# Patient Record
Sex: FEMALE | Race: OTHER
[De-identification: names, ages, dates, MRNs, and addresses within clinical notes are randomized per-mention and may not be internally consistent; named-entity substitution may affect disease eponyms.]

---

## 2019-03-15 ENCOUNTER — HOSPITAL ENCOUNTER (INPATIENT)
Dept: HOSPITAL 72 - EMR | Age: 84
LOS: 7 days | DRG: 283 | End: 2019-03-22
Payer: MEDICARE

## 2019-03-15 VITALS — DIASTOLIC BLOOD PRESSURE: 100 MMHG | SYSTOLIC BLOOD PRESSURE: 180 MMHG

## 2019-03-15 VITALS — HEIGHT: 59 IN | WEIGHT: 131.13 LBS | BODY MASS INDEX: 26.44 KG/M2

## 2019-03-15 VITALS — SYSTOLIC BLOOD PRESSURE: 194 MMHG | DIASTOLIC BLOOD PRESSURE: 115 MMHG

## 2019-03-15 VITALS — DIASTOLIC BLOOD PRESSURE: 100 MMHG | SYSTOLIC BLOOD PRESSURE: 163 MMHG

## 2019-03-15 VITALS — DIASTOLIC BLOOD PRESSURE: 123 MMHG | SYSTOLIC BLOOD PRESSURE: 187 MMHG

## 2019-03-15 DIAGNOSIS — I21.4: Primary | ICD-10-CM

## 2019-03-15 DIAGNOSIS — R13.10: ICD-10-CM

## 2019-03-15 DIAGNOSIS — R00.0: ICD-10-CM

## 2019-03-15 DIAGNOSIS — E44.1: ICD-10-CM

## 2019-03-15 DIAGNOSIS — J98.01: ICD-10-CM

## 2019-03-15 DIAGNOSIS — D75.1: ICD-10-CM

## 2019-03-15 DIAGNOSIS — J44.9: ICD-10-CM

## 2019-03-15 DIAGNOSIS — T38.0X5A: ICD-10-CM

## 2019-03-15 DIAGNOSIS — F03.90: ICD-10-CM

## 2019-03-15 DIAGNOSIS — N17.9: ICD-10-CM

## 2019-03-15 DIAGNOSIS — J69.0: ICD-10-CM

## 2019-03-15 DIAGNOSIS — Z88.6: ICD-10-CM

## 2019-03-15 DIAGNOSIS — D72.829: ICD-10-CM

## 2019-03-15 DIAGNOSIS — I16.0: ICD-10-CM

## 2019-03-15 DIAGNOSIS — E87.0: ICD-10-CM

## 2019-03-15 DIAGNOSIS — Z51.5: ICD-10-CM

## 2019-03-15 DIAGNOSIS — Z66: ICD-10-CM

## 2019-03-15 DIAGNOSIS — F41.9: ICD-10-CM

## 2019-03-15 DIAGNOSIS — E87.8: ICD-10-CM

## 2019-03-15 DIAGNOSIS — J96.01: ICD-10-CM

## 2019-03-15 DIAGNOSIS — E86.0: ICD-10-CM

## 2019-03-15 DIAGNOSIS — E03.9: ICD-10-CM

## 2019-03-15 LAB
ADD MANUAL DIFF: NO
ALBUMIN SERPL-MCNC: 3.5 G/DL (ref 3.4–5)
ALBUMIN/GLOB SERPL: 0.9 {RATIO} (ref 1–2.7)
ALP SERPL-CCNC: 67 U/L (ref 46–116)
ALT SERPL-CCNC: 22 U/L (ref 12–78)
ANION GAP SERPL CALC-SCNC: 4 MMOL/L (ref 5–15)
APPEARANCE UR: CLEAR
APTT PPP: YELLOW S
AST SERPL-CCNC: 45 U/L (ref 15–37)
BASOPHILS NFR BLD AUTO: 0.3 % (ref 0–2)
BILIRUB SERPL-MCNC: 0.8 MG/DL (ref 0.2–1)
BUN SERPL-MCNC: 57 MG/DL (ref 7–18)
CALCIUM SERPL-MCNC: 9.9 MG/DL (ref 8.5–10.1)
CHLORIDE SERPL-SCNC: 113 MMOL/L (ref 98–107)
CK SERPL-CCNC: 162 U/L (ref 26–308)
CO2 SERPL-SCNC: 33 MMOL/L (ref 21–32)
CREAT SERPL-MCNC: 1.2 MG/DL (ref 0.55–1.3)
EOSINOPHIL NFR BLD AUTO: 0.1 % (ref 0–3)
ERYTHROCYTE [DISTWIDTH] IN BLOOD BY AUTOMATED COUNT: 13.4 % (ref 11.6–14.8)
GLOBULIN SER-MCNC: 3.9 G/DL
GLUCOSE UR STRIP-MCNC: NEGATIVE MG/DL
HCT VFR BLD CALC: 48.9 % (ref 37–47)
HGB BLD-MCNC: 15.7 G/DL (ref 12–16)
KETONES UR QL STRIP: (no result)
LEUKOCYTE ESTERASE UR QL STRIP: NEGATIVE
LYMPHOCYTES NFR BLD AUTO: 9.5 % (ref 20–45)
MCV RBC AUTO: 93 FL (ref 80–99)
MONOCYTES NFR BLD AUTO: 6.5 % (ref 1–10)
NEUTROPHILS NFR BLD AUTO: 83.6 % (ref 45–75)
NITRITE UR QL STRIP: NEGATIVE
PH UR STRIP: 5 [PH] (ref 4.5–8)
PLATELET # BLD: 362 K/UL (ref 150–450)
POTASSIUM SERPL-SCNC: 3.7 MMOL/L (ref 3.5–5.1)
PROT UR QL STRIP: (no result)
RBC # BLD AUTO: 5.23 M/UL (ref 4.2–5.4)
SODIUM SERPL-SCNC: 150 MMOL/L (ref 136–145)
SP GR UR STRIP: 1.02 (ref 1–1.03)
UROBILINOGEN UR-MCNC: NORMAL MG/DL (ref 0–1)
WBC # BLD AUTO: 10.9 K/UL (ref 4.8–10.8)

## 2019-03-15 PROCEDURE — 83735 ASSAY OF MAGNESIUM: CPT

## 2019-03-15 PROCEDURE — 83605 ASSAY OF LACTIC ACID: CPT

## 2019-03-15 PROCEDURE — 99285 EMERGENCY DEPT VISIT HI MDM: CPT

## 2019-03-15 PROCEDURE — 96374 THER/PROPH/DIAG INJ IV PUSH: CPT

## 2019-03-15 PROCEDURE — 82248 BILIRUBIN DIRECT: CPT

## 2019-03-15 PROCEDURE — 80048 BASIC METABOLIC PNL TOTAL CA: CPT

## 2019-03-15 PROCEDURE — 71045 X-RAY EXAM CHEST 1 VIEW: CPT

## 2019-03-15 PROCEDURE — 94640 AIRWAY INHALATION TREATMENT: CPT

## 2019-03-15 PROCEDURE — 82550 ASSAY OF CK (CPK): CPT

## 2019-03-15 PROCEDURE — 80053 COMPREHEN METABOLIC PANEL: CPT

## 2019-03-15 PROCEDURE — 82803 BLOOD GASES ANY COMBINATION: CPT

## 2019-03-15 PROCEDURE — 36415 COLL VENOUS BLD VENIPUNCTURE: CPT

## 2019-03-15 PROCEDURE — 84484 ASSAY OF TROPONIN QUANT: CPT

## 2019-03-15 PROCEDURE — 87040 BLOOD CULTURE FOR BACTERIA: CPT

## 2019-03-15 PROCEDURE — 85007 BL SMEAR W/DIFF WBC COUNT: CPT

## 2019-03-15 PROCEDURE — 93005 ELECTROCARDIOGRAM TRACING: CPT

## 2019-03-15 PROCEDURE — 36600 WITHDRAWAL OF ARTERIAL BLOOD: CPT

## 2019-03-15 PROCEDURE — 94664 DEMO&/EVAL PT USE INHALER: CPT

## 2019-03-15 PROCEDURE — 94760 N-INVAS EAR/PLS OXIMETRY 1: CPT

## 2019-03-15 PROCEDURE — 85025 COMPLETE CBC W/AUTO DIFF WBC: CPT

## 2019-03-15 PROCEDURE — 81003 URINALYSIS AUTO W/O SCOPE: CPT

## 2019-03-15 PROCEDURE — 96375 TX/PRO/DX INJ NEW DRUG ADDON: CPT

## 2019-03-15 RX ADMIN — APIXABAN SCH MG: 2.5 TABLET, FILM COATED ORAL at 21:13

## 2019-03-15 RX ADMIN — METHYLPREDNISOLONE SODIUM SUCCINATE SCH MG: 125 INJECTION, POWDER, FOR SOLUTION INTRAMUSCULAR; INTRAVENOUS at 21:17

## 2019-03-15 RX ADMIN — IPRATROPIUM BROMIDE AND ALBUTEROL SULFATE SCH ML: .5; 3 SOLUTION RESPIRATORY (INHALATION) at 19:00

## 2019-03-15 RX ADMIN — LORAZEPAM PRN MG: 2 INJECTION, SOLUTION INTRAMUSCULAR; INTRAVENOUS at 23:43

## 2019-03-15 RX ADMIN — NITROGLYCERIN SCH PATCH: 0.2 PATCH TRANSDERMAL at 21:12

## 2019-03-15 RX ADMIN — SODIUM CHLORIDE SCH MLS/HR: 0.9 INJECTION INTRAVENOUS at 18:41

## 2019-03-15 RX ADMIN — IPRATROPIUM BROMIDE AND ALBUTEROL SULFATE SCH ML: .5; 3 SOLUTION RESPIRATORY (INHALATION) at 23:53

## 2019-03-15 NOTE — DIAGNOSTIC IMAGING REPORT
Indication: Chest pain, history of COPD

 

Technique: One view of the chest

 

Comparison: none

 

Findings: There is mild generalized interstitial prominence and central bronchial

wall thickening. No focal airspace consolidation. No effusions. Heart size is normal.

The aorta is tortuous and calcified

 

Impression: Minimal interstitial prominence and central bronchial wall thickening,

suspect related to senescent and/or COPD changes. No definite acute process

## 2019-03-15 NOTE — NUR
NURSE NOTES:

Received report from LORY Smith. Patient in bed asleep showing no signs of acute distress. 
Respiration even and non labored on 2L. No SOB noted. HOB elevated, aspiration precaution 
observed. IV lines patent and intact. Bed in lowest position. Call light within reach. All 
needs attended and met. Will continue plan of care.

## 2019-03-15 NOTE — NUR
NURSE NOTES:

received pt awake alert, nonverbal, no distress. 96%o2 on 2lnc, sacral intact, hr 130 by 
palpation, bp 180/100, relayedto Dr Prince along with asking md for prn and admit orders. 
awaiting response

## 2019-03-15 NOTE — NUR
NURSE NOTES:

Dr Prince made aware of bp elevated after troponin , received order to give cardizem 30mg 
po xq1, ordered and given

## 2019-03-15 NOTE — NUR
ED Nurse Note:





Report given to Luis HENLEY

Updated daughter Shania KUNZ, 

ENdorsed to Luis that Hospice Nurse number is [phone]

Patient has no belongings

## 2019-03-15 NOTE — NUR
ED Nurse Note:





Brought in by ambulance from home c/o due to SOB. POA, daughter  called [phone] 
Patient is under hospiece care. Awake, but non verbal. 

Sacral / heel skin is intact

## 2019-03-15 NOTE — HISTORY AND PHYSICAL REPORT
DATE OF ADMISSION:  03/15/2019

PULMONARY/HISTORY AND PHYSICAL



REASON FOR ADMISSION:  Shortness of breath.



HISTORY:  This is an 84-year-old female, presented by paramedics with

significant respiratory distress.  The patient is apparently on hospice

care.  The patient is also on antipsychotics.  The patient's family wanted

to revoke hospice.  The daughter still confirms Do Not Resuscitate status.

The patient care discussed and reviewed.  The patient is unable to give

much in the way of history.  Findings discussed with the nursing staff.

The patient with multitude of medical problems 



PAST MEDICAL HISTORY:  Notable for COPD.  Other medical problems

constipation, possible depression, urinary incontinence, hypothyroidism,

possible coronary artery disease, dementia with psychosis, chronic pain,

and possible thrombotic disorders.



MEDICATIONS:  Reviewed.



ALLERGIES:  Reviewed.



SOCIAL HISTORY:  The patient is a Do Not Resuscitate.  She was previously

on hospice.  Disabled.



REVIEW OF SYSTEMS:  Unobtainable.



PHYSICAL EXAMINATION:

GENERAL:  A well-developed, chronically ill female.

VITAL SIGNS:  However, vital signs, heart rate varying from 119 to 130,

blood pressure 180/100, temperature is 98.5, and respiratory rate is 20.

HEENT:  Overall fairly negative.

NECK:  Otherwise supple.

LUNGS:  Moderate breath sounds.  Scattered rhonchi and wheezes.

CARDIAC:  Tachycardic without murmurs or rubs.

ABDOMEN:  Soft and nontender.

EXTREMITIES:  No cyanosis.  No clear clubbing.

NEUROLOGIC:  Confused, on oxygen, nonverbal currently.



LABORATORY DATA:  Reviewed.  White count is 10.9, hematocrit 48, and

platelets of 362,000.  Chemistries noted.  Sodium 150, BUN 57, and

creatinine 1.2.  Lactic acid 1.4.  Troponin 0.47.  The x-rays were

reviewed with questionable interstitial changes with evidence of COPD.



IMPRESSION:

1. Significant sinus tachycardia.

2. Elevated troponin.

3. Possible demand ischemia.

4. Possible acute myocardial infarction.

5. Evidence of acute renal failure.

6. Hypernatremia.

7. Chronic obstructive pulmonary disease.

8. Shortness of breath.



RECOMMENDATIONS:

1. Supportive care.

2. Resume home medication.

3. IV hydration.

4. Renal evaluation.

5. Intravenous steroids.

6. Cardiology evaluation.

7. Monitor clinically for changes.

8. Do not resuscitate to be confirmed.

9. Prognosis is overall poor.

10. We will follow clinically for further changes and recommendations.









  ______________________________________________

  Audie Prince M.D.





DR:  BRYON

D:  03/15/2019 17:06

T:  03/15/2019 20:36

JOB#:  [id number]

CC:



ROSHNI

## 2019-03-15 NOTE — NUR
NURSE NOTES:

paged Dr Prince 2nd attempt to get admit orders

also awaiting med info from [facility]

## 2019-03-15 NOTE — EMERGENCY ROOM REPORT
History of Present Illness


General


Chief Complaint:  Dyspnea/Respdistress


Source:  Patient, Family Member, EMS





Present Illness


HPI


Patient presents by paramedics with reports of respiratory distress


Sensation of doom


Daughter is here who reports the patient is in hospice care they have been 

attempting IV hydration


Also antipsychotic medication however the patient appeared extremely agitated 

she did not feel comfortable with the patient at home and call paramedics


Patient herself is nonverbal


Has a gaze to the right side





Upon arrival of the daughter she reports that the patient looks significantly 

better than previous


She is still requesting no intubation and no CPR


Allergies:  


Coded Allergies:  


     ASPIRIN (Verified  Allergy, Unknown, 3/15/19)





Patient History


Past Medical History:  see triage record


Pertinent Family History:  none


Reviewed Nursing Documentation:  PMH: Agreed; PSxH: Agreed





Nursing Documentation-PMH


Past Medical History:  No History, Except For


Hx Cardiac Problems:  Yes


Hx Hypertension:  Yes


Hx COPD:  Yes - emphysema


Hx Diabetes:  Yes





Review of Systems


All Other Systems:  limited - Other than the ones mentioned in the history of 

present illness all others are reviewed however they do stay limited due to the 

patient's mental status





Physical Exam





Vital Signs








  Date Time  Temp Pulse Resp B/P (MAP) Pulse Ox O2 Delivery O2 Flow Rate FiO2


 


3/15/19 12:53 95.2 120 4 141/90 97 Room Air  


 


3/15/19 14:21       4.0 36








Sp02 EP Interpretation:  reviewed, normal


General Appearance:  moderate distress - Tachypneic


Head:  normocephalic, atraumatic


Eyes:  bilateral eye PERRL, bilateral eye EOMI


ENT:  dry mucus membranes


Neck:  supple, no meningismus


Respiratory:  crackles, wheezing - Bilaterally mildly tachypneic


Cardiovascular #1:  tachycardia


Gastrointestinal:  non tender, soft


Musculoskeletal:  normal inspection


Neurologic:  other - Responsive to physical stimuli otherwise decreased GCS 

nonverbal, daughter reports is normal for the patient


Skin:  normal color, no rash


Lymphatic:  no adenopathy





Procedures


Critical Care Time


Critical Care Time


50 minutes for multiple re-evaluations, critical presentation concerning for 

specific failure not including any procedural time





Medical Decision Making


Diagnostic Impression:  


 Primary Impression:  


 Respiratory distress


ER Course


Patient is a fairly complex patient with multiple differential to consideration 

including but not limited to cardiac cardiopulmonary and vascular emergencies





Patient provided with further hydration breathing treatments oxygenation 

daughter reports the patient looks significantly improved





She would like to have the patient remain DO NOT RESUSCITATE, and no CPR 

however would like some intervention regarding IV hydration and comfort care





X-ray does not show any acute pathology patient admitted for further inpatient 

care





Labs








Test


  3/15/19


13:15


 


White Blood Count


  10.9 K/UL


(4.8-10.8)


 


Red Blood Count


  5.23 M/UL


(4.20-5.40)


 


Hemoglobin


  15.7 G/DL


(12.0-16.0)


 


Hematocrit


  48.9 %


(37.0-47.0)


 


Mean Corpuscular Volume 93 FL (80-99) 


 


Mean Corpuscular Hemoglobin


  30.1 PG


(27.0-31.0)


 


Mean Corpuscular Hemoglobin


Concent 32.1 G/DL


(32.0-36.0)


 


Red Cell Distribution Width


  13.4 %


(11.6-14.8)


 


Platelet Count


  362 K/UL


(150-450)


 


Mean Platelet Volume


  5.4 FL


(6.5-10.1)


 


Neutrophils (%) (Auto)


  83.6 %


(45.0-75.0)


 


Lymphocytes (%) (Auto)


  9.5 %


(20.0-45.0)


 


Monocytes (%) (Auto)


  6.5 %


(1.0-10.0)


 


Eosinophils (%) (Auto)


  0.1 %


(0.0-3.0)


 


Basophils (%) (Auto)


  0.3 %


(0.0-2.0)


 


Sodium Level


  150 MMOL/L


(136-145)


 


Potassium Level


  3.7 MMOL/L


(3.5-5.1)


 


Chloride Level


  113 MMOL/L


()


 


Carbon Dioxide Level


  33 MMOL/L


(21-32)


 


Anion Gap


  4 mmol/L


(5-15)


 


Blood Urea Nitrogen


  57 mg/dL


(7-18)


 


Creatinine


  1.2 MG/DL


(0.55-1.30)


 


Estimat Glomerular Filtration


Rate  mL/min (>60) 


 


 


Glucose Level


  130 MG/DL


()


 


Lactic Acid Level


  1.40 mmol/L


(0.4-2.0)


 


Calcium Level


  9.9 MG/DL


(8.5-10.1)


 


Total Bilirubin


  0.8 MG/DL


(0.2-1.0)


 


Aspartate Amino Transf


(AST/SGOT) 45 U/L (15-37) 


 


 


Alanine Aminotransferase


(ALT/SGPT) 22 U/L (12-78) 


 


 


Alkaline Phosphatase


  67 U/L


()


 


Total Creatine Kinase


  162 U/L


()


 


Troponin I


  0.470 ng/mL


(0.000-0.056)


 


Total Protein


  7.4 G/DL


(6.4-8.2)


 


Albumin


  3.5 G/DL


(3.4-5.0)


 


Globulin 3.9 g/dL 


 


Albumin/Globulin Ratio 0.9 (1.0-2.7) 








Rhythm Strip Diag. Results


EP Interpretation:  yes


Rate:  125


Rhythm:  no PVC's, no ectopy, other - Sinus tach





Chest X-Ray Diagnostic Results


Chest X-Ray Diagnostic Results :  


   Chest X-Ray Ordered:  Yes


   # of Views/Limited/Complete:  1 View


   Indication:  Chest Pain


   EP Interpretation:  Yes


   Interpretation:  no consolidation, no effusion, no pneumothorax


   Impression:  No acute disease - Some pulmonary congestion


   Electronically Signed by:  Gallo Marshall DO





Last Vital Signs








  Date Time  Temp Pulse Resp B/P (MAP) Pulse Ox O2 Delivery O2 Flow Rate FiO2


 


3/15/19 14:34  119 19  98 Nasal Cannula 4.0 36


 


3/15/19 12:53 95.2   141/90    








Status:  improved


Disposition:  ADMITTED AS INPATIENT


Condition:  Critical


Referrals:  


NON PHYSICIAN (PCP)











Gallo Marshall DO Mar 15, 2019 14:43

## 2019-03-16 VITALS — SYSTOLIC BLOOD PRESSURE: 179 MMHG | DIASTOLIC BLOOD PRESSURE: 118 MMHG

## 2019-03-16 VITALS — SYSTOLIC BLOOD PRESSURE: 150 MMHG | DIASTOLIC BLOOD PRESSURE: 90 MMHG

## 2019-03-16 VITALS — DIASTOLIC BLOOD PRESSURE: 123 MMHG | SYSTOLIC BLOOD PRESSURE: 186 MMHG

## 2019-03-16 VITALS — SYSTOLIC BLOOD PRESSURE: 154 MMHG | DIASTOLIC BLOOD PRESSURE: 87 MMHG

## 2019-03-16 VITALS — SYSTOLIC BLOOD PRESSURE: 170 MMHG | DIASTOLIC BLOOD PRESSURE: 96 MMHG

## 2019-03-16 VITALS — SYSTOLIC BLOOD PRESSURE: 155 MMHG | DIASTOLIC BLOOD PRESSURE: 89 MMHG

## 2019-03-16 LAB
ADD MANUAL DIFF: YES
ANION GAP SERPL CALC-SCNC: 7 MMOL/L (ref 5–15)
BUN SERPL-MCNC: 69 MG/DL (ref 7–18)
CALCIUM SERPL-MCNC: 10 MG/DL (ref 8.5–10.1)
CHLORIDE SERPL-SCNC: 113 MMOL/L (ref 98–107)
CO2 SERPL-SCNC: 32 MMOL/L (ref 21–32)
CREAT SERPL-MCNC: 1.3 MG/DL (ref 0.55–1.3)
ERYTHROCYTE [DISTWIDTH] IN BLOOD BY AUTOMATED COUNT: 13.6 % (ref 11.6–14.8)
HCT VFR BLD CALC: 47.8 % (ref 37–47)
HGB BLD-MCNC: 15.5 G/DL (ref 12–16)
MCV RBC AUTO: 93 FL (ref 80–99)
PLATELET # BLD: 386 K/UL (ref 150–450)
POTASSIUM SERPL-SCNC: 3.6 MMOL/L (ref 3.5–5.1)
RBC # BLD AUTO: 5.13 M/UL (ref 4.2–5.4)
SODIUM SERPL-SCNC: 152 MMOL/L (ref 136–145)
WBC # BLD AUTO: 7 K/UL (ref 4.8–10.8)

## 2019-03-16 RX ADMIN — CITALOPRAM HYDROBROMIDE SCH MG: 20 TABLET, FILM COATED ORAL at 08:29

## 2019-03-16 RX ADMIN — DILTIAZEM HYDROCHLORIDE SCH MG: 60 CAPSULE, EXTENDED RELEASE ORAL at 12:32

## 2019-03-16 RX ADMIN — METHYLPREDNISOLONE SODIUM SUCCINATE SCH MG: 125 INJECTION, POWDER, FOR SOLUTION INTRAMUSCULAR; INTRAVENOUS at 08:28

## 2019-03-16 RX ADMIN — IPRATROPIUM BROMIDE AND ALBUTEROL SULFATE SCH ML: .5; 3 SOLUTION RESPIRATORY (INHALATION) at 03:12

## 2019-03-16 RX ADMIN — SODIUM CHLORIDE SCH MLS/HR: 0.9 INJECTION INTRAVENOUS at 05:46

## 2019-03-16 RX ADMIN — TOLTERODINE TARTRATE SCH MG: 2 TABLET, FILM COATED ORAL at 08:29

## 2019-03-16 RX ADMIN — SODIUM CHLORIDE SCH MLS/HR: 0.9 INJECTION INTRAVENOUS at 01:37

## 2019-03-16 RX ADMIN — APIXABAN SCH MG: 2.5 TABLET, FILM COATED ORAL at 08:29

## 2019-03-16 RX ADMIN — SODIUM CHLORIDE SCH MLS/HR: 0.9 INJECTION INTRAVENOUS at 18:47

## 2019-03-16 RX ADMIN — APIXABAN SCH MG: 2.5 TABLET, FILM COATED ORAL at 21:00

## 2019-03-16 RX ADMIN — IPRATROPIUM BROMIDE AND ALBUTEROL SULFATE SCH ML: .5; 3 SOLUTION RESPIRATORY (INHALATION) at 23:22

## 2019-03-16 RX ADMIN — PANTOPRAZOLE SODIUM SCH MG: 40 INJECTION, POWDER, FOR SOLUTION INTRAVENOUS at 08:42

## 2019-03-16 RX ADMIN — SODIUM CHLORIDE SCH MLS/HR: 0.9 INJECTION INTRAVENOUS at 18:48

## 2019-03-16 RX ADMIN — IPRATROPIUM BROMIDE AND ALBUTEROL SULFATE SCH ML: .5; 3 SOLUTION RESPIRATORY (INHALATION) at 15:00

## 2019-03-16 RX ADMIN — DILTIAZEM HYDROCHLORIDE SCH MG: 60 CAPSULE, EXTENDED RELEASE ORAL at 00:00

## 2019-03-16 RX ADMIN — PANTOPRAZOLE SODIUM SCH MG: 40 INJECTION, POWDER, FOR SOLUTION INTRAVENOUS at 21:33

## 2019-03-16 RX ADMIN — DILTIAZEM HYDROCHLORIDE SCH MG: 60 CAPSULE, EXTENDED RELEASE ORAL at 06:00

## 2019-03-16 RX ADMIN — IPRATROPIUM BROMIDE AND ALBUTEROL SULFATE SCH ML: .5; 3 SOLUTION RESPIRATORY (INHALATION) at 20:02

## 2019-03-16 RX ADMIN — NITROGLYCERIN SCH PATCH: 0.2 PATCH TRANSDERMAL at 21:33

## 2019-03-16 RX ADMIN — IPRATROPIUM BROMIDE AND ALBUTEROL SULFATE SCH ML: .5; 3 SOLUTION RESPIRATORY (INHALATION) at 11:00

## 2019-03-16 RX ADMIN — DILTIAZEM HYDROCHLORIDE SCH MG: 60 CAPSULE, EXTENDED RELEASE ORAL at 18:00

## 2019-03-16 RX ADMIN — SODIUM CHLORIDE SCH MLS/HR: 0.9 INJECTION INTRAVENOUS at 12:32

## 2019-03-16 RX ADMIN — IPRATROPIUM BROMIDE AND ALBUTEROL SULFATE SCH ML: .5; 3 SOLUTION RESPIRATORY (INHALATION) at 07:00

## 2019-03-16 NOTE — NUR
NURSE NOTES:

Received report from LORY Hernández. Patient in bed asleep showing no signs of acute distress. 
Respiration even and non labored on 9L O2 Venturi Mask. No SOB noted. HOB elevated, 
aspiration precaution observed. IV lines patent and intact. Bed in lowest position. Call 
light within reach. All needs attended and met. Will continue plan of care.

## 2019-03-16 NOTE — NUR
HAND-OFF: 

Report given to LORY Haque.  Patient sitting in semi-Dobson's position, sleeping, 
non-arousable to voice, on venturi mask, daughter at bedside, bed in lowest position, call 
light within reach, in no apparent distress.

## 2019-03-16 NOTE — PULMONOLOGY PROGRESS NOTE
Assessment/Plan


Assessment/Plan





IMPRESSION:


1. Significant sinus tachycardia.


2. Elevated troponin.


3. Possible demand ischemia.


4. Possible acute myocardial infarction.


5. Evidence of acute renal failure.


6. Hypernatremia.


7. Chronic obstructive pulmonary disease.


8. Shortness of breath.


9. hypertension


10. anxiety 





PLAN


IV hydration


bp rx


iv solumedrol


iv antibiotics


ativan


DNR confirmed


prognosis poor


impression, plan, and exam edited and reviewed in detail


care discussed with RN





Subjective


ROS Limited/Unobtainable:  Yes


Allergies:  


Coded Allergies:  


     ASPIRIN (Verified  Allergy, Unknown, 3/15/19)


Subjective


withdrawn





Objective





Last 24 Hour Vital Signs








  Date Time  Temp Pulse Resp B/P (MAP) Pulse Ox O2 Delivery O2 Flow Rate FiO2


 


3/16/19 08:27    186/123    


 


3/16/19 08:01     94 Nasal Cannula 4.0 36


 


3/16/19 08:01      Nasal Cannula 4.0 36


 


3/16/19 08:00 98.3 127 21 186/123 (144) 93   


 


3/16/19 07:59  124 19  94 Nasal Cannula 3.0 32


 


3/16/19 07:57  124 20  94 Nasal Cannula 3.0 32


 


3/16/19 05:46  128  174/111    


 


3/16/19 04:00 99.0 121 20 154/87 (109) 92   


 


3/16/19 04:00  120      


 


3/16/19 03:23  117 19  96 Nasal Cannula 3.0 32


 


3/16/19 03:12  120 20  94 Nasal Cannula 3.0 32


 


3/16/19 01:37  153  156/92    


 


3/16/19 00:00 98.8 117 22 150/90 (110) 90   


 


3/16/19 00:00  117  150/90    


 


3/16/19 00:00  159      


 


3/15/19 23:53      Nasal Cannula 4.0 36


 


3/15/19 23:53      Nasal Cannula 4.0 36


 


3/15/19 23:11    173/113    


 


3/15/19 21:45 98.6       


 


3/15/19 21:16    194/115    


 


3/15/19 21:12    194/115    


 


3/15/19 21:00      Nasal Cannula 4.0 36


 


3/15/19 21:00      Nasal Cannula 2.0 


 


3/15/19 21:00     95 Nasal Cannula 4.0 36


 


3/15/19 20:51  140 22   Nasal Cannula 4.0 36


 


3/15/19 20:48  140 20  96 Nasal Cannula 4.0 36


 


3/15/19 20:48      Nasal Cannula  


 


3/15/19 20:00 101.1 148 20 194/115 (141) 93   


 


3/15/19 20:00  140      


 


3/15/19 19:24  140  187/123    


 


3/15/19 19:04 98.5 140 20 187/123 (144) 95   


 


3/15/19 17:16    180/100    


 


3/15/19 16:15  123      


 


3/15/19 16:08      Nasal Cannula 2.0 


 


3/15/19 15:32 98.5 130 20 180/100 (126) 95   


 


3/15/19 15:00 96.8 118 23 163/100 97 Nasal Cannula 4.0 36


 


3/15/19 14:59  119 23   Nasal Cannula 4.0 36


 


3/15/19 14:59 96.8 118 23 163/100 97 Nasal Cannula 4.0 36


 


3/15/19 14:34  119 19  98 Nasal Cannula 4.0 36


 


3/15/19 14:21  119 25  98 Nasal Cannula 4.0 36


 


3/15/19 14:21  119 34   Nasal Cannula 4.0 36


 


3/15/19 12:53 95.2 120 4 141/90 97 Room Air  








Objective


GENERAL:  A well-developed, chronically ill female.


HEENT:  Overall fairly negative.


NECK:  Otherwise supple.


LUNGS:  Moderate breath sounds.  noted rhonchi and wheezes.


CARDIAC:  Tachycardic without murmurs or rubs.


ABDOMEN:  Soft and nontender. no distentions


EXTREMITIES:  No cyanosis.  No clear clubbing.


NEUROLOGIC:  Confused, on oxygen, nonverbal currently.


Laboratory Tests


3/15/19 13:15: 


White Blood Count 10.9H, Red Blood Count 5.23, Hemoglobin 15.7, Hematocrit 48.9H

, Mean Corpuscular Volume 93, Mean Corpuscular Hemoglobin 30.1, Mean 

Corpuscular Hemoglobin Concent 32.1, Red Cell Distribution Width 13.4, Platelet 

Count 362, Mean Platelet Volume 5.4L, Neutrophils (%) (Auto) 83.6H, Lymphocytes 

(%) (Auto) 9.5L, Monocytes (%) (Auto) 6.5, Eosinophils (%) (Auto) 0.1, 

Basophils (%) (Auto) 0.3, Sodium Level 150H, Potassium Level 3.7, Chloride 

Level 113H, Carbon Dioxide Level 33H, Anion Gap 4L, Blood Urea Nitrogen 57H, 

Creatinine 1.2, Estimat Glomerular Filtration Rate , Glucose Level 130H, Lactic 

Acid Level 1.40, Calcium Level 9.9, Total Bilirubin 0.8, Aspartate Amino Transf 

(AST/SGOT) 45H, Alanine Aminotransferase (ALT/SGPT) 22, Alkaline Phosphatase 67

, Total Creatine Kinase 162, Troponin I 0.470H, Total Protein 7.4, Albumin 3.5, 

Globulin 3.9, Albumin/Globulin Ratio 0.9L


3/15/19 14:32: 


Urine Color Yellow, Urine Appearance Clear, Urine pH 5, Urine Specific Gravity 

1.025, Urine Protein 3+H, Urine Glucose (UA) Negative, Urine Ketones 2+H, Urine 

Blood 2+H, Urine Nitrite Negative, Urine Bilirubin Negative, Urine Urobilinogen 

Normal, Urine Leukocyte Esterase Negative, Urine RBC 2-4H, Urine WBC 0, Urine 

Squamous Epithelial Cells Occasional, Urine Bacteria None


3/16/19 04:00: 


Arterial Blood pH 7.450, Arterial Blood Partial Pressure CO2 41.9, Arterial 

Blood Partial Pressure O2 70.6L, Arterial Blood HCO3 28.7H, Arterial Blood 

Oxygen Saturation 94.0L, Arterial Blood Base Excess 4.2H, Meliton Test Positive


3/16/19 05:55: 


White Blood Count 7.0, Red Blood Count 5.13, Hemoglobin 15.5, Hematocrit 47.8H, 

Mean Corpuscular Volume 93, Mean Corpuscular Hemoglobin 30.2, Mean Corpuscular 

Hemoglobin Concent 32.4, Red Cell Distribution Width 13.6, Platelet Count 386, 

Mean Platelet Volume 5.2L, Neutrophils (%) (Auto) , Lymphocytes (%) (Auto) , 

Monocytes (%) (Auto) , Eosinophils (%) (Auto) , Basophils (%) (Auto) , Sodium 

Level 152H, Potassium Level 3.6, Chloride Level 113H, Carbon Dioxide Level 32, 

Anion Gap 7, Blood Urea Nitrogen 69H, Creatinine 1.3, Estimat Glomerular 

Filtration Rate , Glucose Level 217H, Calcium Level 10.0, Neutrophils % (Manual

) [Pending], Lymphocytes % (Manual) [Pending], Platelet Estimate [Pending], 

Platelet Morphology [Pending]





Current Medications








 Medications


  (Trade)  Dose


 Ordered  Sig/Alisha


 Route


 PRN Reason  Start Time


 Stop Time Status Last Admin


Dose Admin


 


 Acetaminophen


  (Tylenol)  650 mg  Q4H  PRN


 RECTAL


 Mild Pain (Pain Scale 1-3)  3/15/19 18:30


 4/14/19 18:29  3/15/19 21:15


 


 


 Albuterol/


 Ipratropium


  (Albuterol/


 Ipratropium)  3 ml  Q4HRT


 HHN


   3/15/19 19:00


 3/20/19 18:59  3/16/19 03:12


 


 


 Apixaban


  (Eliquis)  2.5 mg  Q12HR


 ORAL


   3/15/19 21:00


 4/14/19 20:59  3/16/19 08:29


 


 


 Atropine Sulfate


  (Atropine Opth


 Sol)  2 drop  Q2H  PRN


 SL


 oral secretions  3/15/19 19:00


 4/14/19 18:59   


 


 


 Bisacodyl


  (Dulcolax)  5 mg  DAILYPRN  PRN


 ORAL


 Constipation  3/15/19 18:30


 4/14/19 18:29   


 


 


 Bisacodyl


  (Dulcolax)  10 mg  DAILYPRN  PRN


 RECTAL


 Constipation  3/15/19 18:30


 4/14/19 18:29   


 


 


 Ceftriaxone


 Sodium 1 gm/


 Dextrose  55 ml @ 


 110 mls/hr  Q24H


 IVPB


   3/15/19 18:00


 3/22/19 17:59  3/15/19 18:41


 


 


 Citalopram


 Hydrobromide


  (celeXA)  20 mg  DAILY


 ORAL


   3/16/19 09:00


 4/15/19 08:59   


 


 


 Clonidine HCl


  (Catapres Tab)  0.1 mg  Q4H  PRN


 ORAL


 sbp>150  3/15/19 16:30


 4/14/19 16:29  3/16/19 08:27


 


 


 Diltiazem HCl


  (Cardizem)  30 mg  Q6HR


 ORAL


   3/16/19 00:00


 4/15/19 00:00   


 


 


 Gabapentin


  (Neurontin)  100 mg  QHS


 ORAL


   3/15/19 21:00


 4/14/19 20:59  3/15/19 21:16


 


 


 Guaifenesin


  (Robitussin)  400 mg  Q6H  PRN


 PO


 For Cough  3/16/19 06:30


 4/15/19 06:29   


 


 


 Hydralazine HCl


  (Apresoline)  10 mg  Q4H  PRN


 IV


 SBP above 160  3/15/19 23:00


 4/14/19 22:59  3/15/19 23:11


 


 


 Ipratropium


 Bromide


  (Atrovent)  500 mcg  Q4H  PRN


 HHN


 Shortness of Breath  3/15/19 18:30


 3/20/19 18:29   


 


 


 Levothyroxine


 Sodium


  (Synthroid)  75 mcg  DAILY@0630


 ORAL


   3/16/19 06:30


 4/15/19 06:29   


 


 


 Lorazepam


  (Ativan 2mg/ml


 1ml)  1 mg  Q4H  PRN


 IV


 For Anxiety  3/15/19 21:00


 3/22/19 20:59  3/15/19 23:43


 


 


 Methylprednisolone


 Sodium Succinate


  (Solu-MEDROL)  60 mg  EVERY 12  HOURS


 IVP


   3/15/19 21:00


 4/14/19 20:59  3/16/19 08:28


 


 


 Metoprolol


 Tartrate 10 mg/


 Dextrose  65 ml @ 


 130 mls/hr  Q6HR


 IVPB


   3/16/19 00:00


 4/15/19 00:00  3/16/19 05:46


 


 


 Nitroglycerin


  (Ntg)  1 patch  Q24H


 TDERMAL


   3/15/19 20:00


 4/14/19 19:59  3/15/19 21:12


 


 


 Olanzapine


  (ZyPREXA)  5 mg  QPM


 ORAL


   3/16/19 16:30


 4/15/19 16:29   


 


 


 Ondansetron HCl


  (Zofran)  4 mg  Q6H  PRN


 IVP


 Nausea & Vomiting  3/15/19 22:45


 4/14/19 22:44   


 


 


 Pantoprazole


  (Protonix)  40 mg  Q12HR


 IVP


   3/16/19 09:00


 4/15/19 08:59  3/16/19 08:42


 


 


 Polyethylene


 Glycol


  (Miralax)  17 gm  DAILY  PRN


 ORAL


 Constipation  3/15/19 18:30


 4/14/19 18:29   


 


 


 Sodium Chloride  1,000 ml @ 


 100 mls/hr  Q10H


 IV


   3/15/19 17:00


 4/14/19 16:59  3/15/19 17:00


 


 


 Sodium Phosphate


  (Fleet's Sodium


 Phosl Enema)  133 ml  DAILYPRN  PRN


 RECTAL


 constipation  3/15/19 18:30


 4/14/19 18:29   


 


 


 Tolterodine


 Tartrate


  (Detrol)  2 mg  DAILY


 ORAL


   3/16/19 09:00


 4/15/19 08:59   


 

















Audie Prince MD Mar 16, 2019 09:48

## 2019-03-16 NOTE — CONSULTATION
DATE OF CONSULTATION:  03/16/2019

CONSULTING PHYSICIAN:  Calvin Whitfield M.D.



REFERRING PHYSICIAN:  Audie Prince M.D.



REASON FOR CONSULTATION:  Elevated troponin level with tachycardia.



HISTORY OF PRESENT ILLNESS:  This is an 84-year-old female.  She has been

home on hospice care.  She has advanced dementia.  She apparently has not

been able to take oral medications including her anxiolytics and

analgesics and has become increasingly uncomfortable according to her

daughter.  Hospice care could assist and she was brought to the

hospital.



The patient is unable to give any historical data.  Abnormal

cardiovascular studies have prompted this consultation.



PAST MEDICAL HISTORY:  COPD, dementia with psychosis, neuropathy with

chronic pain, hypothyroidism, history of type 2 diabetes mellitus, 
hypercoagulable state.



ALLERGIES:  Aspirin.



MEDICATIONS:  Reviewed and reconciled.



FAMILY HISTORY:  Noncontributory.



SOCIAL HISTORY:  Prior smoking history.  No history of alcohol or substance

abuse. Advance directives, DNR and more recently on hospice care.



REVIEW OF SYSTEMS:  Otherwise not obtainable.  Pertinent data from family

members as outlined above.



PHYSICAL EXAMINATION:

VITAL SIGNS:  Temperature 95.2, blood pressure 141/90 in the emergency room

with heart rate 120 and respiratory rate 24 and presently 118/100 with

heart rate 130.

HEENT:  Temporal rate wasting, right-sided facial gaze.

HEART:  Regular rhythm rapid rate.  Normal S1, S2.

ABDOMEN:  Soft.

EXTREMITIES:  With trace dependent edema.



LABORATORY AND DIAGNOSTIC DATA:  White count 10.9.  Hematocrit 48, sodium

150, BUN 57, and creatinine 1.2.  Troponin 0.47.



IMPRESSION:

1. Hypertensive urgency.

2. Acute myocardial ischemia and possible non-ST elevation infarction.

3. Sinus tachycardia, possible withdrawal from benzodiazepine.

4. Acute renal failure.

5. Dehydration.

6. Hypernatremia.

7. COPD.

8. Paroxysmal bronchospasm.

9. Advanced dementia.



PLAN:

1. Intravenous steroids.

2. Hypotonic IV fluids.

3. Cautious use of beta-blocker.

4. IV benzodiazepine.

5. Pain control.

6. DVT prophylaxis.

7. Clarify comfort care.









  ______________________________________________

  Calvin Whitfield M.D.





DR:  Vinod

D:  03/15/2019 22:40

T:  03/15/2019 23:18

JOB#:  [id number]

CC:



ROSHNI

## 2019-03-17 VITALS — SYSTOLIC BLOOD PRESSURE: 140 MMHG | DIASTOLIC BLOOD PRESSURE: 82 MMHG

## 2019-03-17 VITALS — SYSTOLIC BLOOD PRESSURE: 159 MMHG | DIASTOLIC BLOOD PRESSURE: 95 MMHG

## 2019-03-17 VITALS — DIASTOLIC BLOOD PRESSURE: 82 MMHG | SYSTOLIC BLOOD PRESSURE: 140 MMHG

## 2019-03-17 VITALS — DIASTOLIC BLOOD PRESSURE: 77 MMHG | SYSTOLIC BLOOD PRESSURE: 132 MMHG

## 2019-03-17 VITALS — DIASTOLIC BLOOD PRESSURE: 80 MMHG | SYSTOLIC BLOOD PRESSURE: 134 MMHG

## 2019-03-17 VITALS — DIASTOLIC BLOOD PRESSURE: 78 MMHG | SYSTOLIC BLOOD PRESSURE: 130 MMHG

## 2019-03-17 RX ADMIN — DILTIAZEM HYDROCHLORIDE SCH MG: 60 CAPSULE, EXTENDED RELEASE ORAL at 00:00

## 2019-03-17 RX ADMIN — DILTIAZEM HYDROCHLORIDE SCH MG: 60 CAPSULE, EXTENDED RELEASE ORAL at 11:46

## 2019-03-17 RX ADMIN — DILTIAZEM HYDROCHLORIDE SCH MG: 60 CAPSULE, EXTENDED RELEASE ORAL at 23:52

## 2019-03-17 RX ADMIN — IPRATROPIUM BROMIDE AND ALBUTEROL SULFATE SCH ML: .5; 3 SOLUTION RESPIRATORY (INHALATION) at 07:33

## 2019-03-17 RX ADMIN — APIXABAN SCH MG: 2.5 TABLET, FILM COATED ORAL at 08:46

## 2019-03-17 RX ADMIN — SODIUM CHLORIDE SCH MLS/HR: 0.9 INJECTION INTRAVENOUS at 17:32

## 2019-03-17 RX ADMIN — SODIUM CHLORIDE SCH MLS/HR: 0.9 INJECTION INTRAVENOUS at 13:49

## 2019-03-17 RX ADMIN — CITALOPRAM HYDROBROMIDE SCH MG: 20 TABLET, FILM COATED ORAL at 08:45

## 2019-03-17 RX ADMIN — LORAZEPAM PRN MG: 2 INJECTION, SOLUTION INTRAMUSCULAR; INTRAVENOUS at 21:24

## 2019-03-17 RX ADMIN — SODIUM CHLORIDE SCH MLS/HR: 0.9 INJECTION INTRAVENOUS at 05:56

## 2019-03-17 RX ADMIN — DILTIAZEM HYDROCHLORIDE SCH MG: 60 CAPSULE, EXTENDED RELEASE ORAL at 06:00

## 2019-03-17 RX ADMIN — DILTIAZEM HYDROCHLORIDE SCH MG: 60 CAPSULE, EXTENDED RELEASE ORAL at 17:26

## 2019-03-17 RX ADMIN — SODIUM CHLORIDE SCH MLS/HR: 0.9 INJECTION INTRAVENOUS at 17:29

## 2019-03-17 RX ADMIN — IPRATROPIUM BROMIDE AND ALBUTEROL SULFATE SCH ML: .5; 3 SOLUTION RESPIRATORY (INHALATION) at 11:15

## 2019-03-17 RX ADMIN — IPRATROPIUM BROMIDE AND ALBUTEROL SULFATE SCH ML: .5; 3 SOLUTION RESPIRATORY (INHALATION) at 15:22

## 2019-03-17 RX ADMIN — IPRATROPIUM BROMIDE AND ALBUTEROL SULFATE SCH ML: .5; 3 SOLUTION RESPIRATORY (INHALATION) at 19:59

## 2019-03-17 RX ADMIN — PANTOPRAZOLE SODIUM SCH MG: 40 INJECTION, POWDER, FOR SOLUTION INTRAVENOUS at 21:24

## 2019-03-17 RX ADMIN — APIXABAN SCH MG: 2.5 TABLET, FILM COATED ORAL at 21:00

## 2019-03-17 RX ADMIN — IPRATROPIUM BROMIDE AND ALBUTEROL SULFATE SCH ML: .5; 3 SOLUTION RESPIRATORY (INHALATION) at 03:18

## 2019-03-17 RX ADMIN — SODIUM CHLORIDE SCH MLS/HR: 0.9 INJECTION INTRAVENOUS at 00:08

## 2019-03-17 RX ADMIN — LORAZEPAM PRN MG: 2 INJECTION, SOLUTION INTRAMUSCULAR; INTRAVENOUS at 15:25

## 2019-03-17 RX ADMIN — TOLTERODINE TARTRATE SCH MG: 2 TABLET, FILM COATED ORAL at 08:46

## 2019-03-17 RX ADMIN — PANTOPRAZOLE SODIUM SCH MG: 40 INJECTION, POWDER, FOR SOLUTION INTRAVENOUS at 08:45

## 2019-03-17 RX ADMIN — SODIUM CHLORIDE SCH MLS/HR: 0.9 INJECTION INTRAVENOUS at 23:51

## 2019-03-17 RX ADMIN — IPRATROPIUM BROMIDE AND ALBUTEROL SULFATE SCH ML: .5; 3 SOLUTION RESPIRATORY (INHALATION) at 22:57

## 2019-03-17 NOTE — NUR
NURSE NOTES:

PER FAMILY REQUEST 

PLS ADMIN ATIVAN EVERY 4 HOURS FOR COMFORT REASONS TONIGHT

AND HOLD ON GIVING ATIVAN  TOMORROW MORNING

## 2019-03-17 NOTE — PROGRESS NOTE
DATE:  03/16/2019

CARDIOLOGY PROGRESS NOTE



SUBJECTIVE:  The patient's condition has deteriorated further.  She remains

congested, short of breath, tachycardic, poorly responsive.  She is unable

to take any nutrition orally.  Blood pressure is labile ranging up to

186/118, presently down to 151/94, heart rate 90, respiratory rate 20, she

is on a Venturi mask.



OBJECTIVE:

LUNGS:  Bilateral breath sounds, rhonchi.

HEART:  Regular rhythm.  Rapid rate.  Normal S1, S2.

ABDOMEN:  Soft.

EXTREMITIES:  Trace edema.



LABORATORY DATA:  White count 7, hemoglobin 15.  Sodium 152, potassium 3.6,

bicarbonate 32, chloride 113, BUN 69, creatinine 1.3, glucose 217.  ABG,

7.45, 42, 71.



IMPRESSION:

1. Hypertensive urgency.

2. Severe dehydration, hypernatremia, and hyperchloremia.

3. Prerenal azotemia with acute kidney injury.

4. Acute myocardial ischemia and possible non-ST elevation myocardial

infarction.

5. Advanced dementia.

6. Secondary polycythemia.

7. COPD with paroxysmal bronchospasm and possible aspiration

pneumonia.

8. Aspirin allergy.

9. Hx hypercoaguable state.



PLAN:

1. DNR, DNI.

2. Intravenous antibiotics.

3. Inhaled bronchodilators.

4. Intravenous steroids.

5. Hypotonic IV fluid hydration.

6. Intravenous and topical antihypertensives.

7. Continue full anti-coagulation.

8. Condition, critical.  Prognosis guarded.









  ______________________________________________

  Calvin Whitfield M.D. DR:  KULDEEP

D:  03/17/2019 00:24

T:  03/17/2019 03:42

JOB#:  [id number]

CC:



[id number]

## 2019-03-17 NOTE — PULMONOLOGY PROGRESS NOTE
Assessment/Plan


Assessment/Plan





IMPRESSION:


1. sinus tachycardia.


2. Elevated troponin.


3. Possible demand ischemia.


4. Possible acute myocardial infarction.


5. Evidence of acute renal failure.


6. Hypernatremia.


7. Chronic obstructive pulmonary disease.


8. Shortness of breath.


9. hypertension


10. anxiety 





PLAN


IV hydration


bp rx


dc solumedrol


?dc antibiotics


ativan prn 


DNR confirmed


prognosis poor; possible dc per daughter's approval


impression, plan, and exam edited and reviewed in detail


care discussed with RN





Subjective


ROS Limited/Unobtainable:  Yes


Allergies:  


Coded Allergies:  


     ASPIRIN (Verified  Allergy, Unknown, 3/15/19)


Subjective


withdrawn


poorly responsive


per daughter, would like to return to hospice but wants pain and anxiety 

controlled





Objective





Last 24 Hour Vital Signs








  Date Time  Temp Pulse Resp B/P (MAP) Pulse Ox O2 Delivery O2 Flow Rate FiO2


 


3/17/19 07:43  91 22  97 Venturi Mask 8.0 40


 


3/17/19 07:33      Venturi Mask 8.0 40


 


3/17/19 07:33     95 Venturi Mask 8.0 40


 


3/17/19 07:33  91 24  95 Venturi Mask 8.0 40


 


3/17/19 05:56  91  135/79    


 


3/17/19 04:00 97.2 90 18 132/77 (95) 97   


 


3/17/19 04:00  90      


 


3/17/19 03:28  89 20  98 Venturi Mask 8.0 40


 


3/17/19 03:18  90 20  96 Venturi Mask 8.0 40


 


3/17/19 00:08  92  159/95    


 


3/17/19 00:00 97.8 92 20 159/95 (116) 96   


 


3/17/19 00:00  92      


 


3/17/19 00:00  92  159/95    


 


3/16/19 23:32  93 20  97 Venturi Mask 8.0 40


 


3/16/19 23:22  89 20  96 Venturi Mask 8.0 40


 


3/16/19 21:33    151/94    


 


3/16/19 21:00      Nasal Cannula 2.0 


 


3/16/19 20:12  89 20  97 Venturi Mask 8.0 40


 


3/16/19 20:02  93 22  95 Venturi Mask 8.0 40


 


3/16/19 20:02     95 Venturi Mask 8.0 40


 


3/16/19 20:02      Nasal Cannula 8.0 40


 


3/16/19 20:00 97.7 92 20 155/89 (111) 95   


 


3/16/19 20:00  95      


 


3/16/19 18:47  92  170/96    


 


3/16/19 18:00  92  170/96    


 


3/16/19 16:00 98.4 92 18 170/96 (120) 95   


 


3/16/19 16:00  92      


 


3/16/19 15:28  121 20  95 Venturi Mask 8.0 40


 


3/16/19 15:28  121 20  96 Venturi Mask 8.0 40


 


3/16/19 12:32  125  186/123    


 


3/16/19 12:32  125  186/123    


 


3/16/19 12:00  119      


 


3/16/19 12:00 98.3 122 18 179/118 (138) 96   


 


3/16/19 11:00  125 20  95 Venturi Mask 8.0 40


 


3/16/19 11:00  125 20  95 Venturi Mask 8.0 40

















Intake and Output  


 


 3/16/19 3/17/19





 19:00 07:00


 


Intake Total  1341.6 ml


 


Output Total 200 ml 


 


Balance -200 ml 1341.6 ml


 


  


 


Intake IV Total  1341.6 ml


 


Output Urine Total 200 ml 








Objective


GENERAL:  A well-developed, chronically ill female. poorly responsive


HEENT:  Overall fairly negative.


NECK:  Otherwise supple.


LUNGS:  Moderate breath sounds.  noted rhonchi and wheezes.


CARDIAC:  Tachycardic without murmurs or rubs.


ABDOMEN:  Soft and nontender. no distentions


EXTREMITIES:  No cyanosis.  No clear clubbing.


NEUROLOGIC:  noncommunicative nonverbal currently.





Microbiology








 Date/Time


Source Procedure


Growth Status


 


 


 3/15/19 13:15


Blood Blood Culture - Preliminary


NO GROWTH AFTER 24 HOURS Resulted


 


 3/15/19 13:15


Blood Blood Culture - Preliminary


NO GROWTH AFTER 24 HOURS Resulted











Current Medications








 Medications


  (Trade)  Dose


 Ordered  Sig/Alisha


 Route


 PRN Reason  Start Time


 Stop Time Status Last Admin


Dose Admin


 


 Acetaminophen


  (Tylenol)  650 mg  Q4H  PRN


 RECTAL


 Mild Pain (Pain Scale 1-3)  3/15/19 18:30


 4/14/19 18:29  3/15/19 21:15


 


 


 Albuterol/


 Ipratropium


  (Albuterol/


 Ipratropium)  3 ml  Q4HRT


 HHN


   3/15/19 19:00


 3/20/19 18:59  3/17/19 07:33


 


 


 Apixaban


  (Eliquis)  2.5 mg  Q12HR


 ORAL


   3/15/19 21:00


 4/14/19 20:59  3/16/19 08:29


 


 


 Atropine Sulfate


  (Atropine Opth


 Sol)  2 drop  Q2H  PRN


 SL


 oral secretions  3/15/19 19:00


 4/14/19 18:59   


 


 


 Bisacodyl


  (Dulcolax)  5 mg  DAILYPRN  PRN


 ORAL


 Constipation  3/15/19 18:30


 4/14/19 18:29   


 


 


 Bisacodyl


  (Dulcolax)  10 mg  DAILYPRN  PRN


 RECTAL


 Constipation  3/15/19 18:30


 4/14/19 18:29   


 


 


 Ceftriaxone


 Sodium 1 gm/


 Dextrose  55 ml @ 


 110 mls/hr  Q24H


 IVPB


   3/15/19 18:00


 3/22/19 17:59  3/16/19 18:48


 


 


 Citalopram


 Hydrobromide


  (celeXA)  20 mg  DAILY


 ORAL


   3/16/19 09:00


 4/15/19 08:59   


 


 


 Clonidine HCl


  (Catapres Tab)  0.1 mg  Q4H  PRN


 ORAL


 sbp>150  3/15/19 16:30


 4/14/19 16:29  3/16/19 08:27


 


 


 Dextrose  1,000 ml @ 


 200 mls/hr  Q5H


 IV


   3/16/19 13:30


 4/15/19 13:29  3/17/19 08:47


 


 


 Diltiazem HCl


  (Cardizem)  30 mg  Q6HR


 ORAL


   3/16/19 00:00


 4/15/19 00:00  3/16/19 12:32


 


 


 Gabapentin


  (Neurontin)  100 mg  QHS


 ORAL


   3/15/19 21:00


 4/14/19 20:59  3/15/19 21:16


 


 


 Guaifenesin


  (Robitussin)  400 mg  Q6H  PRN


 PO


 For Cough  3/16/19 06:30


 4/15/19 06:29   


 


 


 Hydralazine HCl


  (Apresoline)  10 mg  Q4H  PRN


 IV


 SBP above 160  3/15/19 23:00


 4/14/19 22:59  3/15/19 23:11


 


 


 Ipratropium


 Bromide


  (Atrovent)  500 mcg  Q4H  PRN


 HHN


 Shortness of Breath  3/15/19 18:30


 3/20/19 18:29   


 


 


 Levothyroxine


 Sodium


  (Synthroid)  75 mcg  DAILY@0630


 ORAL


   3/16/19 06:30


 4/15/19 06:29   


 


 


 Lorazepam


  (Ativan 2mg/ml


 1ml)  1 mg  Q4H  PRN


 IV


 For Anxiety  3/15/19 21:00


 3/22/19 20:59  3/15/19 23:43


 


 


 Methylprednisolone


 Sodium Succinate


  (Solu-MEDROL)  60 mg  DAILY


 IVP


   3/17/19 09:00


 4/14/19 20:59  3/17/19 08:45


 


 


 Metoprolol


 Tartrate 10 mg/


 Dextrose  65 ml @ 


 130 mls/hr  Q6HR


 IVPB


   3/16/19 00:00


 4/15/19 00:00  3/17/19 05:56


 


 


 Nitroglycerin


  (Ntg)  1 patch  Q24H


 TDERMAL


   3/15/19 20:00


 4/14/19 19:59  3/16/19 21:33


 


 


 Olanzapine


  (ZyPREXA)  5 mg  QPM


 ORAL


   3/16/19 16:30


 4/15/19 16:29   


 


 


 Ondansetron HCl


  (Zofran)  4 mg  Q6H  PRN


 IVP


 Nausea & Vomiting  3/15/19 22:45


 4/14/19 22:44   


 


 


 Pantoprazole


  (Protonix)  40 mg  Q12HR


 IVP


   3/16/19 09:00


 4/15/19 08:59  3/17/19 08:45


 


 


 Polyethylene


 Glycol


  (Miralax)  17 gm  DAILY  PRN


 ORAL


 Constipation  3/15/19 18:30


 4/14/19 18:29   


 


 


 Tolterodine


 Tartrate


  (Detrol)  2 mg  DAILY


 ORAL


   3/16/19 09:00


 4/15/19 08:59   


 

















Audie Prince MD Mar 17, 2019 10:10

## 2019-03-17 NOTE — NUR
NURSE NOTES:

Received report from LORY Salazar. Patient in bed asleep showing no signs of acute distress. 
Respiration even and non labored on 9L O2 Venturi Mask. No SOB noted. HOB elevated, 
aspiration precaution observed. IV lines patent and intact. Bed in lowest position. Call 
light within reach. All needs attended and met. Will continue plan of care.

## 2019-03-17 NOTE — NUR
Received patient in bed asleep showing no signs of acute distress. Respiration even and non 
labored on 9L O2 Venturi Mask. No SOB noted. HOB elevated, aspiration precaution observed. 
IV lines patent, intact and running at prescribed rate. Bed in lowest position. Call light 
within reach. All needs attended and met. Will continue plan of care.

## 2019-03-18 VITALS — SYSTOLIC BLOOD PRESSURE: 134 MMHG | DIASTOLIC BLOOD PRESSURE: 84 MMHG

## 2019-03-18 VITALS — SYSTOLIC BLOOD PRESSURE: 137 MMHG | DIASTOLIC BLOOD PRESSURE: 84 MMHG

## 2019-03-18 VITALS — DIASTOLIC BLOOD PRESSURE: 90 MMHG | SYSTOLIC BLOOD PRESSURE: 146 MMHG

## 2019-03-18 VITALS — DIASTOLIC BLOOD PRESSURE: 74 MMHG | SYSTOLIC BLOOD PRESSURE: 128 MMHG

## 2019-03-18 VITALS — SYSTOLIC BLOOD PRESSURE: 135 MMHG | DIASTOLIC BLOOD PRESSURE: 79 MMHG

## 2019-03-18 VITALS — SYSTOLIC BLOOD PRESSURE: 129 MMHG | DIASTOLIC BLOOD PRESSURE: 73 MMHG

## 2019-03-18 LAB
ADD MANUAL DIFF: YES
ALBUMIN SERPL-MCNC: 3.2 G/DL (ref 3.4–5)
ALBUMIN/GLOB SERPL: 0.8 {RATIO} (ref 1–2.7)
ALP SERPL-CCNC: 57 U/L (ref 46–116)
ALT SERPL-CCNC: 26 U/L (ref 12–78)
ANION GAP SERPL CALC-SCNC: 5 MMOL/L (ref 5–15)
AST SERPL-CCNC: 36 U/L (ref 15–37)
BILIRUB SERPL-MCNC: 0.9 MG/DL (ref 0.2–1)
BUN SERPL-MCNC: 48 MG/DL (ref 7–18)
CALCIUM SERPL-MCNC: 9.5 MG/DL (ref 8.5–10.1)
CHLORIDE SERPL-SCNC: 99 MMOL/L (ref 98–107)
CO2 SERPL-SCNC: 32 MMOL/L (ref 21–32)
CREAT SERPL-MCNC: 1.2 MG/DL (ref 0.55–1.3)
ERYTHROCYTE [DISTWIDTH] IN BLOOD BY AUTOMATED COUNT: 13.6 % (ref 11.6–14.8)
GLOBULIN SER-MCNC: 3.8 G/DL
HCT VFR BLD CALC: 49.3 % (ref 37–47)
HGB BLD-MCNC: 16.4 G/DL (ref 12–16)
MCV RBC AUTO: 94 FL (ref 80–99)
PLATELET # BLD: 319 K/UL (ref 150–450)
POTASSIUM SERPL-SCNC: 4.8 MMOL/L (ref 3.5–5.1)
RBC # BLD AUTO: 5.26 M/UL (ref 4.2–5.4)
SODIUM SERPL-SCNC: 136 MMOL/L (ref 136–145)
WBC # BLD AUTO: 19.2 K/UL (ref 4.8–10.8)

## 2019-03-18 RX ADMIN — DILTIAZEM HYDROCHLORIDE SCH MG: 60 CAPSULE, EXTENDED RELEASE ORAL at 23:41

## 2019-03-18 RX ADMIN — IPRATROPIUM BROMIDE AND ALBUTEROL SULFATE SCH ML: .5; 3 SOLUTION RESPIRATORY (INHALATION) at 13:00

## 2019-03-18 RX ADMIN — APIXABAN SCH MG: 2.5 TABLET, FILM COATED ORAL at 09:00

## 2019-03-18 RX ADMIN — SODIUM CHLORIDE SCH MLS/HR: 0.9 INJECTION INTRAVENOUS at 05:32

## 2019-03-18 RX ADMIN — DILTIAZEM HYDROCHLORIDE SCH MG: 60 CAPSULE, EXTENDED RELEASE ORAL at 12:00

## 2019-03-18 RX ADMIN — IPRATROPIUM BROMIDE AND ALBUTEROL SULFATE SCH ML: .5; 3 SOLUTION RESPIRATORY (INHALATION) at 19:15

## 2019-03-18 RX ADMIN — SODIUM CHLORIDE SCH MLS/HR: 0.9 INJECTION INTRAVENOUS at 12:32

## 2019-03-18 RX ADMIN — IPRATROPIUM BROMIDE AND ALBUTEROL SULFATE SCH ML: .5; 3 SOLUTION RESPIRATORY (INHALATION) at 02:57

## 2019-03-18 RX ADMIN — IPRATROPIUM BROMIDE AND ALBUTEROL SULFATE SCH ML: .5; 3 SOLUTION RESPIRATORY (INHALATION) at 06:53

## 2019-03-18 RX ADMIN — DILTIAZEM HYDROCHLORIDE SCH MG: 60 CAPSULE, EXTENDED RELEASE ORAL at 05:32

## 2019-03-18 RX ADMIN — TOLTERODINE TARTRATE SCH MG: 2 TABLET, FILM COATED ORAL at 09:00

## 2019-03-18 RX ADMIN — PANTOPRAZOLE SODIUM SCH MG: 40 INJECTION, POWDER, FOR SOLUTION INTRAVENOUS at 09:34

## 2019-03-18 RX ADMIN — IPRATROPIUM BROMIDE AND ALBUTEROL SULFATE SCH ML: .5; 3 SOLUTION RESPIRATORY (INHALATION) at 10:22

## 2019-03-18 RX ADMIN — SODIUM CHLORIDE SCH MLS/HR: 0.9 INJECTION INTRAVENOUS at 23:41

## 2019-03-18 RX ADMIN — SODIUM CHLORIDE SCH MLS/HR: 0.9 INJECTION INTRAVENOUS at 18:26

## 2019-03-18 RX ADMIN — APIXABAN SCH MG: 2.5 TABLET, FILM COATED ORAL at 20:07

## 2019-03-18 RX ADMIN — DILTIAZEM HYDROCHLORIDE SCH MG: 60 CAPSULE, EXTENDED RELEASE ORAL at 18:00

## 2019-03-18 RX ADMIN — CITALOPRAM HYDROBROMIDE SCH MG: 20 TABLET, FILM COATED ORAL at 09:00

## 2019-03-18 RX ADMIN — LORAZEPAM PRN MG: 2 INJECTION, SOLUTION INTRAMUSCULAR; INTRAVENOUS at 18:51

## 2019-03-18 RX ADMIN — PANTOPRAZOLE SODIUM SCH MG: 40 INJECTION, POWDER, FOR SOLUTION INTRAVENOUS at 20:07

## 2019-03-18 NOTE — PROGRESS NOTE
CARDIOLOGY PROGRESS NOTE



DATE:  03/17/2019



SUBJECTIVE:  The patient appears comfortable and is withdrawn and poorly

responsive.  Her daughter is at bedside and feels that the patient has

anxiety and pain.



OBJECTIVE:

VITAL SIGNS:  Blood pressure 135/79, pulse 91, and respirations 18.

Monitored sinus tachycardia.

LUNGS:  Good breath sounds.  No wheezing.

HEART:  Regular rhythm and rate.  Normal S1, S2.

ABDOMEN:  Soft.

EXTREMITIES:  No edema.



LABORATORY DATA:  Blood cultures negative.



IMPRESSION:

1. Dehydration.

2. Hyponatremia.

3. Advanced dementia.

4. Anxiety.

5. Secondary sinus tachycardia.

6. Probable acute myocardial ischemia.

7. Hypercoaguable state.



PLAN:

1. Pain control.

2. Anxiolytics.

3. Hypotonic IV fluids.

4. Recheck laboratory studies.

5. DNR, DNI.

6. Continue current cardiovascular regimen without change, including apixaban.









  ______________________________________________

  Calvin Whitfield M.D.





DR:  JULISSA

D:  03/17/2019 22:56

T:  03/17/2019 23:36

JOB#:  [id number]

CC:



ROSHNI

## 2019-03-18 NOTE — PULMONOLOGY PROGRESS NOTE
Assessment/Plan


Assessment/Plan





IMPRESSION:


1. sinus tachycardia.


2. Elevated troponin.


3. Possible demand ischemia.


4. Possible acute myocardial infarction.


5. Evidence of acute renal failure.


6. Hypernatremia.


7. Chronic obstructive pulmonary disease.


8. Shortness of breath.


9. hypertension


10. anxiety 





PLAN


IV hydration


bp rx


elevated wbc ?due to steroid use


dc antibiotics


ativan prn 


DNR confirmed


prognosis poor; possible dc pending d/w daughter


impression, plan, and exam edited and reviewed in detail


care discussed with RN





Subjective


ROS Limited/Unobtainable:  Yes


Allergies:  


Coded Allergies:  


     ASPIRIN (Verified  Allergy, Unknown, 3/15/19)


Subjective


withdrawn


poorly responsive





Objective





Last 24 Hour Vital Signs








  Date Time  Temp Pulse Resp B/P (MAP) Pulse Ox O2 Delivery O2 Flow Rate FiO2


 


3/18/19 06:59  87 18  94 Venturi Mask 8.0 40


 


3/18/19 06:53     92 Venturi Mask 8.0 40


 


3/18/19 06:53      Venturi Mask 8.0 40


 


3/18/19 06:53  89 18  92 Venturi Mask 8.0 40


 


3/18/19 05:32  58  145/67    


 


3/18/19 04:00  88      


 


3/18/19 04:00 96.7 88 20 135/79 (97) 96   


 


3/18/19 03:11  86 22  97 Venturi Mask 8.0 40


 


3/18/19 02:57  90 22  96 Venturi Mask 8.0 40


 


3/18/19 00:00 96.6 85 16 146/90 (108) 96   


 


3/17/19 23:51  118  124/67    


 


3/17/19 23:07  59 22  98 Venturi Mask 8.0 40


 


3/17/19 22:57  59 22  97 Venturi Mask 8.0 40


 


3/17/19 21:00      Nasal Cannula 2.0 


 


3/17/19 20:09  89 22  95 Venturi Mask 8.0 40


 


3/17/19 20:00 96.8 86 20 134/80 (98) 95   


 


3/17/19 20:00  90      


 


3/17/19 19:59      Venturi Mask 8.0 40


 


3/17/19 19:59  88 22  95 Venturi Mask 8.0 40


 


3/17/19 19:59     95 Venturi Mask 8.0 40


 


3/17/19 17:29  94  130/78    


 


3/17/19 17:26  94  130/78    


 


3/17/19 16:24 97.4 94 22 130/78 (95) 100   


 


3/17/19 16:00  90      


 


3/17/19 15:35  89 22  97 Venturi Mask 8.0 40


 


3/17/19 15:22  91 26  92 Venturi Mask 8.0 40


 


3/17/19 13:49  95  140/82    


 


3/17/19 12:00  91      


 


3/17/19 11:49 97.2 95 22 140/82 (101) 100   


 


3/17/19 11:25  91 24  98 Venturi Mask 8.0 40


 


3/17/19 11:15  92 23  98 Venturi Mask 8.0 40


 


3/17/19 10:23      Nasal Cannula 2.0 

















Intake and Output  


 


 3/17/19 3/18/19





 19:00 07:00


 


Intake Total 1800 ml 565 ml


 


Balance 1800 ml 565 ml


 


  


 


Intake IV Total 1800 ml 565 ml








Objective


GENERAL:  A well-developed, chronically ill female. poorly responsive


HEENT:  Overall fairly negative.


NECK:  Otherwise supple.


LUNGS:  Moderate breath sounds.  noted rhonchi and wheezes.


CARDIAC:  Tachycardic without murmurs or rubs.


ABDOMEN:  Soft and nontender. no distentions


EXTREMITIES:  No cyanosis.  No clear clubbing.


NEUROLOGIC:  noncommunicative nonverbal currently.





Microbiology








 Date/Time


Source Procedure


Growth Status


 


 


 3/15/19 13:15


Blood Blood Culture - Preliminary


NO GROWTH AFTER 48 HOURS Resulted


 


 3/15/19 13:15


Blood Blood Culture - Preliminary


NO GROWTH AFTER 48 HOURS Resulted








Laboratory Tests


3/18/19 05:55: 


White Blood Count 19.2H, Red Blood Count 5.26, Hemoglobin 16.4H, Hematocrit 

49.3H, Mean Corpuscular Volume 94, Mean Corpuscular Hemoglobin 31.3H, Mean 

Corpuscular Hemoglobin Concent 33.4, Red Cell Distribution Width 13.6, Platelet 

Count 319, Mean Platelet Volume 5.5L, Neutrophils (%) (Auto) , Lymphocytes (%) (

Auto) , Monocytes (%) (Auto) , Eosinophils (%) (Auto) , Basophils (%) (Auto) , 

Neutrophils % (Manual) [Pending], Lymphocytes % (Manual) [Pending], Platelet 

Estimate [Pending], Platelet Morphology [Pending], Sodium Level 136, Potassium 

Level 4.8, Chloride Level 99, Carbon Dioxide Level 32, Anion Gap 5, Blood Urea 

Nitrogen 48H, Creatinine 1.2, Estimat Glomerular Filtration Rate , Glucose 

Level 240H, Calcium Level 9.5, Magnesium Level 2.4, Total Bilirubin 0.9, 

Aspartate Amino Transf (AST/SGOT) 36, Alanine Aminotransferase (ALT/SGPT) 26, 

Alkaline Phosphatase 57, Troponin I [Pending], Total Protein 7.0, Albumin 3.2L, 

Globulin 3.8, Albumin/Globulin Ratio 0.8L





Current Medications








 Medications


  (Trade)  Dose


 Ordered  Sig/Alisha


 Route


 PRN Reason  Start Time


 Stop Time Status Last Admin


Dose Admin


 


 Acetaminophen


  (Tylenol)  650 mg  Q4H  PRN


 RECTAL


 Mild Pain (Pain Scale 1-3)  3/15/19 18:30


 4/14/19 18:29  3/15/19 21:15


 


 


 Albuterol/


 Ipratropium


  (Albuterol/


 Ipratropium)  3 ml  Q4HRT


 HHN


   3/15/19 19:00


 3/20/19 18:59  3/18/19 06:53


 


 


 Apixaban


  (Eliquis)  2.5 mg  Q12HR


 ORAL


   3/15/19 21:00


 4/14/19 20:59  3/16/19 08:29


 


 


 Atropine Sulfate


  (Atropine Opth


 Sol)  2 drop  Q2H  PRN


 SL


 oral secretions  3/15/19 19:00


 4/14/19 18:59   


 


 


 Bisacodyl


  (Dulcolax)  5 mg  DAILYPRN  PRN


 ORAL


 Constipation  3/15/19 18:30


 4/14/19 18:29   


 


 


 Bisacodyl


  (Dulcolax)  10 mg  DAILYPRN  PRN


 RECTAL


 Constipation  3/15/19 18:30


 4/14/19 18:29   


 


 


 Ceftriaxone


 Sodium 1 gm/


 Dextrose  55 ml @ 


 110 mls/hr  Q24H


 IVPB


   3/15/19 18:00


 3/22/19 17:59  3/17/19 17:32


 


 


 Citalopram


 Hydrobromide


  (celeXA)  20 mg  DAILY


 ORAL


   3/16/19 09:00


 4/15/19 08:59   


 


 


 Clonidine HCl


  (Catapres Tab)  0.1 mg  Q4H  PRN


 ORAL


 sbp>150  3/15/19 16:30


 4/14/19 16:29  3/16/19 08:27


 


 


 Dextrose  1,000 ml @ 


 100 mls/hr  Q10H


 IV


   3/17/19 13:30


 4/15/19 13:29  3/17/19 23:50


 


 


 Diltiazem HCl


  (Cardizem)  30 mg  Q6HR


 ORAL


   3/16/19 00:00


 4/15/19 00:00  3/16/19 12:32


 


 


 Guaifenesin


  (Robitussin)  400 mg  Q6H  PRN


 PO


 For Cough  3/16/19 06:30


 4/15/19 06:29   


 


 


 Hydralazine HCl


  (Apresoline)  10 mg  Q4H  PRN


 IV


 SBP above 160  3/15/19 23:00


 4/14/19 22:59  3/15/19 23:11


 


 


 Ipratropium


 Bromide


  (Atrovent)  500 mcg  Q4H  PRN


 HHN


 Shortness of Breath  3/15/19 18:30


 3/20/19 18:29   


 


 


 Levothyroxine


 Sodium


  (Synthroid)  75 mcg  DAILY@0630


 ORAL


   3/16/19 06:30


 4/15/19 06:29   


 


 


 Lorazepam


  (Ativan 2mg/ml


 1ml)  1 mg  Q4H  PRN


 IV


 For Anxiety  3/15/19 21:00


 3/22/19 20:59  3/17/19 21:24


 


 


 Metoprolol


 Tartrate 10 mg/


 Dextrose  65 ml @ 


 130 mls/hr  Q6HR


 IVPB


   3/16/19 00:00


 4/15/19 00:00  3/18/19 05:32


 


 


 Olanzapine


  (ZyPREXA)  5 mg  QPM


 ORAL


   3/16/19 16:30


 4/15/19 16:29   


 


 


 Ondansetron HCl


  (Zofran)  4 mg  Q6H  PRN


 IVP


 Nausea & Vomiting  3/15/19 22:45


 4/14/19 22:44   


 


 


 Pantoprazole


  (Protonix)  40 mg  Q12HR


 IVP


   3/16/19 09:00


 4/15/19 08:59  3/17/19 21:24


 


 


 Polyethylene


 Glycol


  (Miralax)  17 gm  DAILY  PRN


 ORAL


 Constipation  3/15/19 18:30


 4/14/19 18:29   


 


 


 Tolterodine


 Tartrate


  (Detrol)  2 mg  DAILY


 ORAL


   3/16/19 09:00


 4/15/19 08:59   


 

















Audie Prince MD Mar 18, 2019 08:19

## 2019-03-18 NOTE — NUR
ST NOTE: BEDSIDE SWALLOW EVAL/CONSULT



RECEIVED BEDSIDE SWALLOW EVAL ORDER

CHART REVIEWED PRIOR THE EVALUATION 

COMPLETED AS SWALLOW CONSULT



PT IS A 84-YEAR-OLD FEMALE WHO WAS ADMITTED FOR DYSPNEA.

DYSPHAGIA RISK FACTORS: COPD, EMPHYSEMA, H/O ADVANCED DEMENTIA WITH 

PSYCHOSIS, DMII. 



PLOF: PT RESIDES AT HOME WITH FAMILY. 

PT WAS ON HOSPICE CARE. 

UNKNOWN FOR DIET.

PER PT'S POLST: DNR/DNI, SELECTIVE TX, DID NOT INDICATE FOR TUBE FEEDING 

IF NEEDED. 



CURRENT STATUS: PT SEEN AT BEDSIDE IN PM. AWAKE, NONVERBAL, DID NOT FOLLOW 

DIRECTIONS. PT WITH VENTURI MASK(8L). 

SHORTNESS OF BREATH AND SHALLOW BREATHING WAS NOTED. RESPIRATORY RATE IS 

HIGH: >30s, HEART RATE IS HIGH: 120s , WAS WENT UP TO 150s. 



PER RT, PT IS NOT STABLE, PT MIGHT REQUIRE BIPAP.  



NO PO WAS GIVEN AT THIS TIME. PT IS AT HIGH RISK FOR ASPIRATION DUE TO  

PT'S OVERALL WEAKNESS AND RESP INSUFFICIENCY. 



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 



RECOMMENDATIONS:

1. NPO WITH ORAL CARE

2. CONSIDER TEMPORARILY NONORAL FEEDING MEANS(NGT) IF NEEDED

3. CONSIDER COMFORT MEASURE. 

4. SKILLED ST SERVICE TO FOLLOW X 1-2 TIME.



D/W RN, JEANNIE AND THE STAFF

## 2019-03-18 NOTE — NUR
NURSE NOTES:

Report received from LORY Barraza.  Patient seen in bed in semi peres position with oxygen via 
venturi mask at 9L.  No S/Sx of pain is noted via FLACC scale.  Noted IV site to Right AC 
20g and Left FA 22g, both is intact.  CUrrently on IVF of D5W running at 100cc/hr. Pure wick 
is in place and intacta 

-------------------------------------------------------------------------------

Addendum: 03/18/19 at 1946 by Jessie Machado RN

-------------------------------------------------------------------------------

NURSE NOTES:

Report received from LORY Barraza.  Patient seen in bed in semi peres position with oxygen via 
venturi mask at 9L.  No S/Sx of pain is noted via FLACC scale.  Noted IV site to Right AC 
20g and Left FA 22g, both is intact.  CUrrently on IVF of D5W running at 100cc/hr. Pure wick 
is in place and intact at this time.  Bed is in lowest position. call light is within easy 
reach while in bed. will continue to monitor.

## 2019-03-18 NOTE — NUR
CASE MANAGEMENT:REVIEW



84 YR OLD FEMALE BIBA FROM HOME



CC: SOB

PMH: HOSPICE?. ALLERGIC TO ASPIRIN



SI: RESPIRATORY DISTRESS

95.2   120  23   141/90  97% ON RA

WBC+19.2  NA+152   BUN+57   TROPONIN(+) 0.470



IS: IV ATIVAN 

DUONEB HHN

CXR

BLOOD CX

**: TO TELEMETRY

IS: IVF@100/HR



**INTERQUAL CRITERIA MET

## 2019-03-18 NOTE — NUR
NURSE NOTES:

Received report from LORY Brown. Patient is resting in bed asleep showing no signs of acute 
distress at this time. Respiration even and non labored on 9L O2 Venturi Mask. HOB elevated, 
aspiration precaution observed. IV lines patent, intact and running at prescribed rate. Bed 
in lowest position with two side rails up. Call light and bed side table within reach. Will 
continue plan of care.

## 2019-03-19 VITALS — SYSTOLIC BLOOD PRESSURE: 123 MMHG | DIASTOLIC BLOOD PRESSURE: 74 MMHG

## 2019-03-19 VITALS — DIASTOLIC BLOOD PRESSURE: 74 MMHG | SYSTOLIC BLOOD PRESSURE: 139 MMHG

## 2019-03-19 VITALS — DIASTOLIC BLOOD PRESSURE: 66 MMHG | SYSTOLIC BLOOD PRESSURE: 124 MMHG

## 2019-03-19 VITALS — SYSTOLIC BLOOD PRESSURE: 128 MMHG | DIASTOLIC BLOOD PRESSURE: 83 MMHG

## 2019-03-19 VITALS — SYSTOLIC BLOOD PRESSURE: 131 MMHG | DIASTOLIC BLOOD PRESSURE: 83 MMHG

## 2019-03-19 VITALS — SYSTOLIC BLOOD PRESSURE: 157 MMHG | DIASTOLIC BLOOD PRESSURE: 80 MMHG

## 2019-03-19 RX ADMIN — PANTOPRAZOLE SODIUM SCH MG: 40 INJECTION, POWDER, FOR SOLUTION INTRAVENOUS at 21:48

## 2019-03-19 RX ADMIN — PANTOPRAZOLE SODIUM SCH MG: 40 INJECTION, POWDER, FOR SOLUTION INTRAVENOUS at 09:00

## 2019-03-19 RX ADMIN — IPRATROPIUM BROMIDE AND ALBUTEROL SULFATE SCH ML: .5; 3 SOLUTION RESPIRATORY (INHALATION) at 19:37

## 2019-03-19 RX ADMIN — SODIUM CHLORIDE SCH MLS/HR: 0.9 INJECTION INTRAVENOUS at 04:41

## 2019-03-19 RX ADMIN — DILTIAZEM HYDROCHLORIDE SCH MG: 60 CAPSULE, EXTENDED RELEASE ORAL at 12:00

## 2019-03-19 RX ADMIN — APIXABAN SCH MG: 2.5 TABLET, FILM COATED ORAL at 21:00

## 2019-03-19 RX ADMIN — APIXABAN SCH MG: 2.5 TABLET, FILM COATED ORAL at 09:00

## 2019-03-19 RX ADMIN — IPRATROPIUM BROMIDE AND ALBUTEROL SULFATE SCH ML: .5; 3 SOLUTION RESPIRATORY (INHALATION) at 07:53

## 2019-03-19 RX ADMIN — IPRATROPIUM BROMIDE AND ALBUTEROL SULFATE SCH ML: .5; 3 SOLUTION RESPIRATORY (INHALATION) at 01:13

## 2019-03-19 RX ADMIN — CITALOPRAM HYDROBROMIDE SCH MG: 20 TABLET, FILM COATED ORAL at 09:00

## 2019-03-19 RX ADMIN — LORAZEPAM PRN MG: 2 INJECTION, SOLUTION INTRAMUSCULAR; INTRAVENOUS at 09:04

## 2019-03-19 RX ADMIN — SODIUM CHLORIDE SCH MLS/HR: 0.9 INJECTION INTRAVENOUS at 18:42

## 2019-03-19 RX ADMIN — SODIUM CHLORIDE SCH MLS/HR: 0.9 INJECTION INTRAVENOUS at 12:11

## 2019-03-19 RX ADMIN — DILTIAZEM HYDROCHLORIDE SCH MG: 60 CAPSULE, EXTENDED RELEASE ORAL at 04:42

## 2019-03-19 RX ADMIN — IPRATROPIUM BROMIDE AND ALBUTEROL SULFATE SCH ML: .5; 3 SOLUTION RESPIRATORY (INHALATION) at 12:27

## 2019-03-19 RX ADMIN — LORAZEPAM PRN MG: 2 INJECTION, SOLUTION INTRAMUSCULAR; INTRAVENOUS at 13:25

## 2019-03-19 RX ADMIN — TOLTERODINE TARTRATE SCH MG: 2 TABLET, FILM COATED ORAL at 09:00

## 2019-03-19 RX ADMIN — DILTIAZEM HYDROCHLORIDE SCH MG: 60 CAPSULE, EXTENDED RELEASE ORAL at 18:00

## 2019-03-19 NOTE — PROGRESS NOTE
DATE:  03/18/2019

CARDIOLOGY PROGRESS NOTE



SUBJECTIVE:  The patient remains DNI status.  Prognosis remains poor.  She

is still with episodes of anxiety and agitation.  Heart rates have

stabilized for most of the day.



OBJECTIVE:

VITAL SIGNS:  Blood pressure 145/67, heart rate 58 to 89, and respiratory

rate 18 to 20.  She is afebrile.

LUNGS:  Diminished breath sounds.

HEART:  Regular rhythm and rate.  Normal S1 and S2.

ABDOMEN:  Soft.

EXTREMITIES:  No edema.



LABORATORY DATA:  White count 19, hemoglobin 16, troponin 0.32, BUN 48,

creatinine 1.2, and glucose 240.



IMPRESSION:

1. Acute myocardial ischemia.

2. Acute on chronic renal failure.

3. Dehydration.

4. Hypercoaguable state.

5. Hyperchloremia.

6. Mild protein calorie malnutrition.

7. Leukocytosis.



PLAN:

1. Hydration.

2. Pain control and anxiolytics.

3. Evaluate for possible source of infection.

4. Maintain current cardiovascular regimen.

5. Conservative management.









  ______________________________________________

  Calvin Whitfield M.D.





DR:  LOLA

D:  03/19/2019 02:44

T:  03/19/2019 04:06

JOB#:  [id number]

CC:



ROSHNI

## 2019-03-19 NOTE — PULMONOLOGY PROGRESS NOTE
Assessment/Plan


Assessment/Plan





IMPRESSION:


1. sinus tachycardia.


2. Elevated troponin.


3. Possible demand ischemia.


4. Possible acute myocardial infarction.


5. Evidence of acute renal failure.


6. Hypernatremia.


7. Chronic obstructive pulmonary disease.


8. Shortness of breath.


9. hypertension


10. anxiety 





PLAN


IV hydration


bp rx


elevated wbc ?due to steroid use


off antibiotics


ativan prn 


keep comfortable; daughter may want to provide comfort care in acute


DNR confirmed


prognosis poor;  


impression, plan, and exam edited and reviewed in detail


care discussed with RN





Subjective


Allergies:  


Coded Allergies:  


     ASPIRIN (Verified  Allergy, Unknown, 3/15/19)


Subjective


withdrawn


poorly responsive





Objective





Last 24 Hour Vital Signs








  Date Time  Temp Pulse Resp B/P (MAP) Pulse Ox O2 Delivery O2 Flow Rate FiO2


 


3/19/19 08:08  68 30  92 Venturi Mask 10.0 45


 


3/19/19 07:56     92 Venturi Mask 10.0 45


 


3/19/19 07:56      Venturi Mask 10.0 45


 


3/19/19 07:56  68 30  92 Venturi Mask 14.0 55


 


3/19/19 04:42  80  124/66    


 


3/19/19 04:41  80  124/70    


 


3/19/19 04:00 97.9 135 19 124/66 (85) 94   


 


3/19/19 03:38  135      


 


3/19/19 01:16  68 24  91 Venturi Mask 10.0 45


 


3/19/19 01:09  73 28  90 Venturi Mask 14.0 55


 


3/19/19 00:00 98.8 75 28 131/83 (99) 96   


 


3/18/19 23:41  120  128/74    


 


3/18/19 23:41  120  128/74    


 


3/18/19 23:30  135      


 


3/18/19 21:00      Nasal Cannula 2.0 


 


3/18/19 20:00 97.6 76 22 128/74 (92) 96   


 


3/18/19 19:37  120      


 


3/18/19 19:25  67 24  92 Venturi Mask 10.0 45


 


3/18/19 19:10      Venturi Mask 14.0 55


 


3/18/19 19:10  77 28  91 Venturi Mask 14.0 55


 


3/18/19 19:09     92 Venturi Mask 14.0 55


 


3/18/19 18:26  97  137/84    


 


3/18/19 18:00  97  137/84    


 


3/18/19 16:00  168      


 


3/18/19 16:00 96.7 97 20 137/84 (101) 93   


 


3/18/19 12:32  106  129/73    


 


3/18/19 12:00 97.9 93 20 129/73 (91) 96   


 


3/18/19 12:00  106  129/73    


 


3/18/19 12:00  94      


 


3/18/19 10:28  87 24  94 Venturi Mask 8.0 40


 


3/18/19 10:22  58 16  92 Venturi Mask 8.0 40


 


3/18/19 09:00      Nasal Cannula 2.0 

















Intake and Output  


 


 3/18/19 3/19/19





 18:59 06:59


 


Intake Total  965 ml


 


Balance  965 ml


 


  


 


Intake IV Total  965 ml


 


# Voids  2








Objective


GENERAL:  A well-developed, chronically ill female. poorly responsive


HEENT:  Overall fairly negative.


NECK:  Otherwise supple.


LUNGS:  Moderate breath sounds. some rhonchi and wheezes. tachypneic


CARDIAC:  Tachycardic without murmurs or rubs.


ABDOMEN:  Soft and nontender. no distentions


EXTREMITIES:  No cyanosis.  No clear clubbing.


NEUROLOGIC:  noncommunicative nonverbal currently.





Current Medications








 Medications


  (Trade)  Dose


 Ordered  Sig/Alisha


 Route


 PRN Reason  Start Time


 Stop Time Status Last Admin


Dose Admin


 


 Acetaminophen


  (Tylenol)  650 mg  Q4H  PRN


 RECTAL


 Mild Pain (Pain Scale 1-3)  3/15/19 18:30


 4/14/19 18:29  3/15/19 21:15


 


 


 Albuterol/


 Ipratropium


  (Albuterol/


 Ipratropium)  3 ml  Q6HRT


 HHN


   3/18/19 13:00


 3/20/19 18:59  3/19/19 07:53


 


 


 Apixaban


  (Eliquis)  2.5 mg  Q12HR


 ORAL


   3/15/19 21:00


 4/14/19 20:59  3/16/19 08:29


 


 


 Atropine Sulfate


  (Atropine Opth


 Sol)  2 drop  Q2H  PRN


 SL


 oral secretions  3/15/19 19:00


 4/14/19 18:59   


 


 


 Bisacodyl


  (Dulcolax)  5 mg  DAILYPRN  PRN


 ORAL


 Constipation  3/15/19 18:30


 4/14/19 18:29   


 


 


 Bisacodyl


  (Dulcolax)  10 mg  DAILYPRN  PRN


 RECTAL


 Constipation  3/15/19 18:30


 4/14/19 18:29   


 


 


 Citalopram


 Hydrobromide


  (celeXA)  20 mg  DAILY


 ORAL


   3/16/19 09:00


 4/15/19 08:59   


 


 


 Clonidine HCl


  (Catapres Tab)  0.1 mg  Q4H  PRN


 ORAL


 sbp>150  3/15/19 16:30


 4/14/19 16:29  3/16/19 08:27


 


 


 Dextrose  1,000 ml @ 


 100 mls/hr  Q10H


 IV


   3/17/19 13:30


 4/15/19 13:29  3/19/19 04:40


 


 


 Diltiazem HCl


  (Cardizem)  30 mg  Q6HR


 ORAL


   3/16/19 00:00


 4/15/19 00:00  3/16/19 12:32


 


 


 Guaifenesin


  (Robitussin)  400 mg  Q6H  PRN


 PO


 For Cough  3/16/19 06:30


 4/15/19 06:29   


 


 


 Hydralazine HCl


  (Apresoline)  10 mg  Q4H  PRN


 IV


 SBP above 160  3/15/19 23:00


 4/14/19 22:59  3/15/19 23:11


 


 


 Ipratropium


 Bromide


  (Atrovent)  500 mcg  Q4H  PRN


 HHN


 Shortness of Breath  3/15/19 18:30


 3/20/19 18:29   


 


 


 Levothyroxine


 Sodium


  (Synthroid)  75 mcg  DAILY@0630


 ORAL


   3/16/19 06:30


 4/15/19 06:29   


 


 


 Lorazepam


  (Ativan 2mg/ml


 1ml)  1 mg  Q4H  PRN


 IV


 For Anxiety  3/15/19 21:00


 3/22/19 20:59  3/18/19 18:51


 


 


 Metoprolol


 Tartrate 10 mg/


 Dextrose  65 ml @ 


 130 mls/hr  Q6HR


 IVPB


   3/16/19 00:00


 4/15/19 00:00  3/19/19 04:41


 


 


 Olanzapine


  (ZyPREXA)  5 mg  QPM


 ORAL


   3/16/19 16:30


 4/15/19 16:29   


 


 


 Ondansetron HCl


  (Zofran)  4 mg  Q6H  PRN


 IVP


 Nausea & Vomiting  3/15/19 22:45


 4/14/19 22:44   


 


 


 Pantoprazole


  (Protonix)  40 mg  Q12HR


 IVP


   3/16/19 09:00


 4/15/19 08:59  3/18/19 20:07


 


 


 Polyethylene


 Glycol


  (Miralax)  17 gm  DAILY  PRN


 ORAL


 Constipation  3/15/19 18:30


 4/14/19 18:29   


 


 


 Tolterodine


 Tartrate


  (Detrol)  2 mg  DAILY


 ORAL


   3/16/19 09:00


 4/15/19 08:59   


 

















Audie Prince MD Mar 19, 2019 08:58

## 2019-03-19 NOTE — NUR
RD ASSESSMENT & RECOMMENDATIONS

SEE CARE ACTIVITY FOR COMPLETE ASSESSMENT



DAILY ESTIMATED NEEDS:

Needs based on cardiac 61.7kg  

25-30  kcals/kg 

9394-0153  total kcals

1-1.2  g protein/kg

62-74  g total protein 

25-30  mL/kg

7825-7090  total fluid mLs





NUTRITION DIAGNOSIS:

Altered nutrition related lab values r/t clinical status as evidenced 

by elev BG (200's), elev WBC (19.2), elev HGb (16.4), elev BUN 

(48, trending down).



CURRENT DIET: NPO     





PO DIET RECOMMENDATIONS:

LIBERAL/ REGULAR as medically appropriate/ texture per SLP  





ENTERAL NUTRITION RECOMMENDATIONS:

* Consult RD if non-oral feeds are part of POC *   





ADDITIONAL RECOMMENDATIONS:

1) RE-calibrate bed scale for accurate CBW 

     EMR wt: 90# vs bed scale wt: 135# 

2) Pt is NPO/ w/ no non-oral feeds  

    -> Consult RD if pt is cleared for oral po

## 2019-03-19 NOTE — NUR
NURSE NOTES:

seen by Dr. Prince and ordered Ativan prn 1mg IVP to keep patient comfortable per family 
request. Considering patient putting under hospice care.

## 2019-03-19 NOTE — NUR
NURSE NOTES:



RECEIVED PATIENT NON VERBAL, RECEIVING BREATHING TREATMENT ADMINISTERED BY RT. FALL AND 
ASPIRATION PRECAUTIONS IN PLACE: CALL LIGHT WITHIN REACH, BED IN LOW POSITION AND BED ALARM 
ON. WILL CONTINUE WITH PLAN OF CARE.

## 2019-03-19 NOTE — NUR
ST NOTE: D/C SUMMARY



REASSESSED PT'S CONDITIONS. 

PT'S RR: >30s. OXYGEN LEVEL: 70s, PT IS LETHARGIC.

DISCUSSED WITH RN, KAYE. 

PT'S DAUGHTER WANTS COMFORT MEASURE. 

D/C FROM SKILLED ST SERVICE.

## 2019-03-19 NOTE — NUR
NURSE NOTES:

Report received from Shira HENLEY.  Patient seen in bed in semi peres position with oxygen via 
venturi mask at 15L. Rhythm reported with Sinus tachy during night shift. Non-verbal. NPO. 
Sacral Stage 4 reported. No Signs of pain is noted via FLACC scale. Noted IV site to Right 
AC 20g and Left FA 22g, both is intact. On D5W IVF running at 100cc/hr. Pure wick is in 
place and intact. Continue to monitor.

## 2019-03-20 VITALS — DIASTOLIC BLOOD PRESSURE: 107 MMHG | SYSTOLIC BLOOD PRESSURE: 163 MMHG

## 2019-03-20 VITALS — DIASTOLIC BLOOD PRESSURE: 104 MMHG | SYSTOLIC BLOOD PRESSURE: 137 MMHG

## 2019-03-20 VITALS — DIASTOLIC BLOOD PRESSURE: 107 MMHG | SYSTOLIC BLOOD PRESSURE: 161 MMHG

## 2019-03-20 VITALS — DIASTOLIC BLOOD PRESSURE: 95 MMHG | SYSTOLIC BLOOD PRESSURE: 148 MMHG

## 2019-03-20 VITALS — SYSTOLIC BLOOD PRESSURE: 133 MMHG | DIASTOLIC BLOOD PRESSURE: 74 MMHG

## 2019-03-20 RX ADMIN — APIXABAN SCH MG: 2.5 TABLET, FILM COATED ORAL at 09:00

## 2019-03-20 RX ADMIN — PANTOPRAZOLE SODIUM SCH MG: 40 INJECTION, POWDER, FOR SOLUTION INTRAVENOUS at 23:43

## 2019-03-20 RX ADMIN — SODIUM CHLORIDE SCH MLS/HR: 0.9 INJECTION INTRAVENOUS at 05:56

## 2019-03-20 RX ADMIN — CITALOPRAM HYDROBROMIDE SCH MG: 20 TABLET, FILM COATED ORAL at 09:00

## 2019-03-20 RX ADMIN — Medication SCH MCG: at 07:11

## 2019-03-20 RX ADMIN — Medication SCH MCG: at 20:09

## 2019-03-20 RX ADMIN — SODIUM CHLORIDE SCH MLS/HR: 0.9 INJECTION INTRAVENOUS at 00:26

## 2019-03-20 RX ADMIN — SODIUM CHLORIDE SCH MLS/HR: 0.9 INJECTION INTRAVENOUS at 18:34

## 2019-03-20 RX ADMIN — SODIUM CHLORIDE SCH MLS/HR: 0.9 INJECTION INTRAVENOUS at 23:50

## 2019-03-20 RX ADMIN — METOPROLOL TARTRATE SCH MG: 50 TABLET, FILM COATED ORAL at 22:47

## 2019-03-20 RX ADMIN — SODIUM CHLORIDE SCH MLS/HR: 0.9 INJECTION INTRAVENOUS at 12:54

## 2019-03-20 RX ADMIN — TOLTERODINE TARTRATE SCH MG: 2 TABLET, FILM COATED ORAL at 09:00

## 2019-03-20 RX ADMIN — METOPROLOL TARTRATE SCH MG: 50 TABLET, FILM COATED ORAL at 09:00

## 2019-03-20 RX ADMIN — APIXABAN SCH MG: 2.5 TABLET, FILM COATED ORAL at 21:00

## 2019-03-20 RX ADMIN — Medication SCH MCG: at 12:49

## 2019-03-20 RX ADMIN — LORAZEPAM PRN MG: 2 INJECTION, SOLUTION INTRAMUSCULAR; INTRAVENOUS at 09:35

## 2019-03-20 RX ADMIN — MORPHINE SULFATE PRN MG: 2 INJECTION, SOLUTION INTRAMUSCULAR; INTRAVENOUS at 17:47

## 2019-03-20 RX ADMIN — PANTOPRAZOLE SODIUM SCH MG: 40 INJECTION, POWDER, FOR SOLUTION INTRAVENOUS at 09:35

## 2019-03-20 RX ADMIN — Medication SCH MCG: at 00:48

## 2019-03-20 RX ADMIN — MORPHINE SULFATE PRN MG: 2 INJECTION, SOLUTION INTRAMUSCULAR; INTRAVENOUS at 12:56

## 2019-03-20 NOTE — NUR
end of shift



Patient appearing comfortable, family at bed side. Non-responsive however. Family appearing 
to be accepting of circumstances. Discussed administration of anxiolytics and narcotics. 
Family advocating for comfort. IV still infusing, continuing on non-rebreather for oxygen 
support. RR decreased in 30s now. Less facial grimacing. Plan to discuss treatment tomorrow 
again with MD. Patient's daughter said she spoke to Dr. Treviño today.

## 2019-03-20 NOTE — PROGRESS NOTE
DATE:  03/19/2019

CARDIOLOGY PROGRESS NOTE



SUBJECTIVE:  The patient remains tachycardiac, withdrawn, lethargic, and

poorly responsive.



OBJECTIVE:

VITAL SIGNS:  Blood pressure 124/66, pulse 130, and respirations

18.

LUNGS:  Diminished breath sounds.

HEART:  Regular rhythm.  Rapid rate.  Normal S1, S2.

ABDOMEN:  Soft.

EXTREMITIES:  No edema.



IMPRESSION:

1. Advanced dementia.

2. Anxiety.

3. Sinus tachycardia.

4. Leukocytosis due to steroids.

5. Acute renal failure and dehydration and hypovolemia, improving.

6. Hypernatremia, resolved.

7. Acute myocardial ischemia and possible non-ST-elevation infarction

likely due to hypoperfusion.



PLAN:  Medical management to include hydration, beta-blockade, anxiolytics,

nutritional support, and DNR.  Will add topical antiHTN therapy if BP remains 
elevated.









  ______________________________________________

  Calvin Whitfield M.D.





DR:  VALENTÍN

D:  03/19/2019 23:26

T:  03/20/2019 00:42

JOB#:  [id number]

CC:



ROSHNI

## 2019-03-20 NOTE — NUR
Collaborating with Dr. Machado. Per patient's daughter Shania, over the phone, requesting 
comfort care. Informed Dr. Machado to contact doctor and received 1 new verbal order for 
morphine PRN.

## 2019-03-20 NOTE — PULMONOLOGY PROGRESS NOTE
Assessment/Plan


Assessment/Plan


Pulmonary Progress Note 





Assessment/Plan





IMPRESSION:


1. Sinus tachycardia.


2. Elevated troponin.


3. Possible demand ischemia.


4. Possible acute myocardial infarction.


5. Evidence of acute renal failure.


6. Hypernatremia.


7. Chronic obstructive pulmonary disease.


8. Shortness of breath.


9. hypertension


10. Dementia/anxiety 





PLAN


IV hydration PRN


bp rx


elevated wbc ?due to steroid use


off antibiotics


ativan prn 


keep comfortable; daughter may want to provide comfort care in acute


DNR confirmed


prognosis poor;  


impression, plan, and exam edited and reviewed in detail


care discussed with RN





Subjective


Allergies:  


Coded Allergies:  


     ASPIRIN (Verified  Allergy, Unknown, 3/15/19)


Subjective


withdrawn


poorly responsive





Objective





Vital Signs Noted





Objective


GENERAL:  A well-developed, chronically ill female. poorly responsive


HEENT:  Overall fairly negative.


NECK:  Otherwise supple.


LUNGS:  Moderate breath sounds. some rhonchi and wheezes. tachypneic


CARDIAC:  Tachycardic without murmurs or rubs.


ABDOMEN:  Soft and nontender. no distentions


EXTREMITIES:  No cyanosis.  No clear clubbing.


NEUROLOGIC:  noncommunicative nonverbal currently.





Current Medications








 Medications


  (Trade)  Dose


 Ordered  Sig/Alisha


 Route


 PRN Reason  Start Time


 Stop Time Status Last Admin


Dose Admin


 


 Acetaminophen


  (Tylenol)  650 mg  Q4H  PRN


 RECTAL


 Mild Pain (Pain Scale 1-3)  3/15/19 18:30


 4/14/19 18:29  3/15/19 21:15


 


 


 Albuterol/


 Ipratropium


  (Albuterol/


 Ipratropium)  3 ml  Q6HRT


 HHN


   3/18/19 13:00


 3/20/19 18:59  3/19/19 07:53


 


 


 Apixaban


  (Eliquis)  2.5 mg  Q12HR


 ORAL


   3/15/19 21:00


 4/14/19 20:59  3/16/19 08:29


 


 


 Atropine Sulfate


  (Atropine Opth


 Sol)  2 drop  Q2H  PRN


 SL


 oral secretions  3/15/19 19:00


 4/14/19 18:59   


 


 


 Bisacodyl


  (Dulcolax)  5 mg  DAILYPRN  PRN


 ORAL


 Constipation  3/15/19 18:30


 4/14/19 18:29   


 


 


 Bisacodyl


  (Dulcolax)  10 mg  DAILYPRN  PRN


 RECTAL


 Constipation  3/15/19 18:30


 4/14/19 18:29   


 


 


 Citalopram


 Hydrobromide


  (celeXA)  20 mg  DAILY


 ORAL


   3/16/19 09:00


 4/15/19 08:59   


 


 


 Clonidine HCl


  (Catapres Tab)  0.1 mg  Q4H  PRN


 ORAL


 sbp>150  3/15/19 16:30


 4/14/19 16:29  3/16/19 08:27


 


 


 Dextrose  1,000 ml @ 


 100 mls/hr  Q10H


 IV


   3/17/19 13:30


 4/15/19 13:29  3/19/19 04:40


 


 


 Diltiazem HCl


  (Cardizem)  30 mg  Q6HR


 ORAL


   3/16/19 00:00


 4/15/19 00:00  3/16/19 12:32


 


 


 Guaifenesin


  (Robitussin)  400 mg  Q6H  PRN


 PO


 For Cough  3/16/19 06:30


 4/15/19 06:29   


 


 


 Hydralazine HCl


  (Apresoline)  10 mg  Q4H  PRN


 IV


 SBP above 160  3/15/19 23:00


 4/14/19 22:59  3/15/19 23:11


 


 


 Ipratropium


 Bromide


  (Atrovent)  500 mcg  Q4H  PRN


 HHN


 Shortness of Breath  3/15/19 18:30


 3/20/19 18:29   


 


 


 Levothyroxine


 Sodium


  (Synthroid)  75 mcg  DAILY@0630


 ORAL


   3/16/19 06:30


 4/15/19 06:29   


 


 


 Lorazepam


  (Ativan 2mg/ml


 1ml)  1 mg  Q4H  PRN


 IV


 For Anxiety  3/15/19 21:00


 3/22/19 20:59  3/18/19 18:51


 


 


 Metoprolol


 Tartrate 10 mg/


 Dextrose  65 ml @ 


 130 mls/hr  Q6HR


 IVPB


   3/16/19 00:00


 4/15/19 00:00  3/19/19 04:41


 


 


 Olanzapine


  (ZyPREXA)  5 mg  QPM


 ORAL


   3/16/19 16:30


 4/15/19 16:29   


 


 


 Ondansetron HCl


  (Zofran)  4 mg  Q6H  PRN


 IVP


 Nausea & Vomiting  3/15/19 22:45


 4/14/19 22:44   


 


 


 Pantoprazole


  (Protonix)  40 mg  Q12HR


 IVP


   3/16/19 09:00


 4/15/19 08:59  3/18/19 20:07


 


 


 Polyethylene


 Glycol


  (Miralax)  17 gm  DAILY  PRN


 ORAL


 Constipation  3/15/19 18:30


 4/14/19 18:29   


 


 


 Tolterodine


 Tartrate


  (Detrol)  2 mg  DAILY


 ORAL


   3/16/19 09:00


 4/15/19 08:59   


 











Subjective


ROS Limited/Unobtainable:  No


Allergies:  


Coded Allergies:  


     ASPIRIN (Verified  Allergy, Unknown, 3/15/19)





Objective





Last 24 Hour Vital Signs








  Date Time  Temp Pulse Resp B/P (MAP) Pulse Ox O2 Delivery O2 Flow Rate FiO2


 


3/20/19 08:00 96.7 114 42 148/95 (112) 97   


 


3/20/19 07:22      Non-Rebreather 15.0 100


 


3/20/19 07:21     92 Non-Rebreather 15.0 100


 


3/20/19 07:11  66 28  90 Venturi Mask 14.0 55


 


3/20/19 07:10  74 28  92 Non-Rebreather 15.0 100


 


3/20/19 05:56  120  137/104    


 


3/20/19 04:00  120      


 


3/20/19 04:00 97.7 116 38 137/104 (115) 91   


 


3/20/19 00:59  82 24  93 Venturi Mask 14.0 55


 


3/20/19 00:48  84 30  91 Venturi Mask 14.0 55


 


3/20/19 00:26  127  133/74    


 


3/20/19 00:00 98.1 66 28 133/74 (93) 91   


 


3/20/19 00:00  127      


 


3/19/19 21:00      Venturi Mask 15.0 


 


3/19/19 21:00 100.3 83 24 123/74 (90) 92   


 


3/19/19 20:00  123      


 


3/19/19 19:48  77 22  94 Venturi Mask 14.0 55


 


3/19/19 19:40      Venturi Mask 14.0 55


 


3/19/19 19:40     92 Venturi Mask 14.0 55


 


3/19/19 19:38  69 30  92 Venturi Mask 14.0 55


 


3/19/19 18:42  130  139/74    


 


3/19/19 18:00  130  139/74    


 


3/19/19 16:00 99.7 130 44 139/74 (95) 94   


 


3/19/19 15:07  129      


 


3/19/19 12:36  69 22  93 Venturi Mask 14.0 55


 


3/19/19 12:27  70 30  93 Venturi Mask 14.0 55


 


3/19/19 12:11  136  157/80    


 


3/19/19 12:00 99.5 136 48 157/80 (105) 91   


 


3/19/19 12:00  136  157/80    


 


3/19/19 11:55  137      

















Intake and Output  


 


 3/19/19 3/20/19





 18:59 06:59


 


Intake Total 500 ml 1095 ml


 


Balance 500 ml 1095 ml


 


  


 


Intake IV Total 500 ml 1095 ml


 


# Voids 2 3











Current Medications








 Medications


  (Trade)  Dose


 Ordered  Sig/Alisha


 Route


 PRN Reason  Start Time


 Stop Time Status Last Admin


Dose Admin


 


 Acetaminophen


  (Tylenol)  650 mg  Q4H  PRN


 RECTAL


 Mild Pain (Pain Scale 1-3)  3/15/19 18:30


 4/14/19 18:29  3/15/19 21:15


 


 


 Apixaban


  (Eliquis)  2.5 mg  Q12HR


 ORAL


   3/15/19 21:00


 4/14/19 20:59  3/16/19 08:29


 


 


 Atropine Sulfate


  (Atropine Opth


 Sol)  2 drop  Q2H  PRN


 SL


 oral secretions  3/15/19 19:00


 4/14/19 18:59   


 


 


 Bisacodyl


  (Dulcolax)  5 mg  DAILYPRN  PRN


 ORAL


 Constipation  3/15/19 18:30


 4/14/19 18:29   


 


 


 Bisacodyl


  (Dulcolax)  10 mg  DAILYPRN  PRN


 RECTAL


 Constipation  3/15/19 18:30


 4/14/19 18:29   


 


 


 Citalopram


 Hydrobromide


  (celeXA)  20 mg  DAILY


 ORAL


   3/16/19 09:00


 4/15/19 08:59   


 


 


 Clonidine HCl


  (Catapres Tab)  0.1 mg  Q4H  PRN


 ORAL


 sbp>150  3/15/19 16:30


 4/14/19 16:29  3/16/19 08:27


 


 


 Dextrose  1,000 ml @ 


 100 mls/hr  Q10H


 IV


   3/17/19 13:30


 4/15/19 13:29  3/20/19 05:56


 


 


 Guaifenesin


  (Robitussin)  400 mg  Q6H  PRN


 PO


 For Cough  3/16/19 06:30


 4/15/19 06:29   


 


 


 Hydralazine HCl


  (Apresoline)  10 mg  Q4H  PRN


 IV


 SBP above 160  3/15/19 23:00


 4/14/19 22:59  3/15/19 23:11


 


 


 Ipratropium


 Bromide


  (Atrovent)  500 mcg  Q4H  PRN


 HHN


 Shortness of Breath  3/15/19 18:30


 3/20/19 18:29   


 


 


 Ipratropium


 Bromide


  (Atrovent)  500 mcg  Q6HRT


 HHN


   3/20/19 01:00


 3/25/19 00:59  3/20/19 07:11


 


 


 Levothyroxine


 Sodium


  (Synthroid)  75 mcg  DAILY@0630


 ORAL


   3/16/19 06:30


 4/15/19 06:29   


 


 


 Lorazepam


  (Ativan 2mg/ml


 1ml)  1 mg  Q4H  PRN


 IV


 For Anxiety  3/15/19 21:00


 3/22/19 20:59  3/20/19 09:35


 


 


 Metoprolol


 Tartrate


  (Lopressor)  50 mg  Q12HR


 ORAL


   3/20/19 09:00


 4/19/19 08:59   


 


 


 Metoprolol


 Tartrate 10 mg/


 Dextrose  65 ml @ 


 130 mls/hr  Q6HR


 IVPB


   3/16/19 00:00


 4/15/19 00:00  3/20/19 05:56


 


 


 Olanzapine


  (ZyPREXA)  5 mg  QPM


 ORAL


   3/16/19 16:30


 4/15/19 16:29   


 


 


 Ondansetron HCl


  (Zofran)  4 mg  Q6H  PRN


 IVP


 Nausea & Vomiting  3/15/19 22:45


 4/14/19 22:44   


 


 


 Pantoprazole


  (Protonix)  40 mg  Q12HR


 IVP


   3/16/19 09:00


 4/15/19 08:59  3/20/19 09:35


 


 


 Polyethylene


 Glycol


  (Miralax)  17 gm  DAILY  PRN


 ORAL


 Constipation  3/15/19 18:30


 4/14/19 18:29   


 


 


 Tolterodine


 Tartrate


  (Detrol)  2 mg  DAILY


 ORAL


   3/16/19 09:00


 4/15/19 08:59   


 

















Calvin Machado MD Mar 20, 2019 11:24

## 2019-03-20 NOTE — NUR
HAND-OFF: 

Report given to TAISHA GOULD RN. PATIENT RESTING IN BED, RT AT BEDSIDE ADMINISTERING HHN 
TREATMENT.

## 2019-03-20 NOTE — NUR
NURSE NOTES:



Received report from LORY Zendejas. Patient in bed asleep showing no signs of acute distress. 
Respiration even and non labored on 15L O2 Non-rebreather Mask. No SOB noted. HOB elevated, 
aspiration precaution observed. IV line patent and intact. Bed in lowest position. Call 
light within reach. All needs attended and met. Will continue plan of care.

## 2019-03-20 NOTE — NUR
NURSE NOTES: Report received from LORY Chan. Pt is sleeping in bed. IV access intact and 
patent. Bed in the lowest position, bed brakes engaged, side rails up x3, call light within 
reach. Will continue to monitor.

## 2019-03-20 NOTE — NUR
Receiving Note:



Patient resting in bed. RR 42, on non-rebreather mask, just suctioned by RT. Tele monitor 
reading . Patient non responsive. Per report from MD Ramonita aware of respiratory status 
and neurological status. RAC IV infusing D5NS @ 100. Patient NPO. Will perform hourly 
rounding. Call light in reach.

## 2019-03-20 NOTE — NUR
CASE MANAGEMENT:REVIEW



3/20/19

SI: ACUTE RENAL FAILURE. DEHYDRATION. AMI

**ADVANCED DEMENTIA

96.7    114  42  148/95  97% ON NON REBREATHER 15L @ 100% FIO2

WBC+19.2     BUN+48    TROPONIN(+) 0.320



IS: START IV MORPHINE Q3HRS PRN

IV LOPRESSOR Q6HRS

ATROVENT HHN Q6HRS RTC

IVF@100/HR

**: TELEMETRY STATUS 

DCP: PATIENT IS FROM HOME





PLAN:

COMFORT CARE

## 2019-03-20 NOTE — NUR
NURSE NOTES:



PATIENT KEPT CLEAN AND DRY, INCONTINENCE CARE GIVEN AS NEEDED. CALAZIME CREAM APPLIED TO 
PERINEAL AREA, BLE ELEVATED ON PILLOW WITH HEELS FLOATING AND HEEL PROTECTORS APPLIED.

## 2019-03-21 VITALS — DIASTOLIC BLOOD PRESSURE: 88 MMHG | SYSTOLIC BLOOD PRESSURE: 147 MMHG

## 2019-03-21 VITALS — DIASTOLIC BLOOD PRESSURE: 93 MMHG | SYSTOLIC BLOOD PRESSURE: 151 MMHG

## 2019-03-21 VITALS — SYSTOLIC BLOOD PRESSURE: 145 MMHG | DIASTOLIC BLOOD PRESSURE: 95 MMHG

## 2019-03-21 VITALS — DIASTOLIC BLOOD PRESSURE: 86 MMHG | SYSTOLIC BLOOD PRESSURE: 171 MMHG

## 2019-03-21 VITALS — SYSTOLIC BLOOD PRESSURE: 153 MMHG | DIASTOLIC BLOOD PRESSURE: 103 MMHG

## 2019-03-21 VITALS — DIASTOLIC BLOOD PRESSURE: 90 MMHG | SYSTOLIC BLOOD PRESSURE: 148 MMHG

## 2019-03-21 VITALS — DIASTOLIC BLOOD PRESSURE: 84 MMHG | SYSTOLIC BLOOD PRESSURE: 150 MMHG

## 2019-03-21 VITALS — SYSTOLIC BLOOD PRESSURE: 115 MMHG | DIASTOLIC BLOOD PRESSURE: 73 MMHG

## 2019-03-21 VITALS — SYSTOLIC BLOOD PRESSURE: 150 MMHG | DIASTOLIC BLOOD PRESSURE: 90 MMHG

## 2019-03-21 LAB
ADD MANUAL DIFF: YES
ALBUMIN SERPL-MCNC: 2.3 G/DL (ref 3.4–5)
ALBUMIN/GLOB SERPL: 0.5 {RATIO} (ref 1–2.7)
ALP SERPL-CCNC: 86 U/L (ref 46–116)
ALT SERPL-CCNC: 47 U/L (ref 12–78)
ANION GAP SERPL CALC-SCNC: 11 MMOL/L (ref 5–15)
AST SERPL-CCNC: 53 U/L (ref 15–37)
BILIRUB DIRECT SERPL-MCNC: 0.4 MG/DL (ref 0–0.3)
BILIRUB SERPL-MCNC: 1.4 MG/DL (ref 0.2–1)
BUN SERPL-MCNC: 64 MG/DL (ref 7–18)
CALCIUM SERPL-MCNC: 9.2 MG/DL (ref 8.5–10.1)
CHLORIDE SERPL-SCNC: 83 MMOL/L (ref 98–107)
CO2 SERPL-SCNC: 24 MMOL/L (ref 21–32)
CREAT SERPL-MCNC: 1.7 MG/DL (ref 0.55–1.3)
ERYTHROCYTE [DISTWIDTH] IN BLOOD BY AUTOMATED COUNT: 12.8 % (ref 11.6–14.8)
GLOBULIN SER-MCNC: 4.4 G/DL
HCT VFR BLD CALC: 44.1 % (ref 37–47)
HGB BLD-MCNC: 15.6 G/DL (ref 12–16)
MCV RBC AUTO: 88 FL (ref 80–99)
PLATELET # BLD: 221 K/UL (ref 150–450)
POTASSIUM SERPL-SCNC: 4.5 MMOL/L (ref 3.5–5.1)
RBC # BLD AUTO: 5.01 M/UL (ref 4.2–5.4)
SODIUM SERPL-SCNC: 119 MMOL/L (ref 136–145)
WBC # BLD AUTO: 24.2 K/UL (ref 4.8–10.8)

## 2019-03-21 RX ADMIN — Medication SCH MCG: at 21:28

## 2019-03-21 RX ADMIN — Medication SCH MCG: at 08:01

## 2019-03-21 RX ADMIN — Medication SCH MCG: at 01:37

## 2019-03-21 RX ADMIN — Medication SCH MCG: at 19:00

## 2019-03-21 RX ADMIN — SODIUM CHLORIDE SCH MLS/HR: 0.9 INJECTION INTRAVENOUS at 06:39

## 2019-03-21 RX ADMIN — Medication SCH MCG: at 11:43

## 2019-03-21 NOTE — NUR
NURSE NOTES:

Received report from LORY Ramos. Patient in bed asleep showing no signs of acute distress. 
Respiration even and non labored on 10L Non-rebreather mask. No SOB noted. HOB elevated. IV 
line patent and intact running on PCA pump. Vitals stable Bed in lowest position. Comfort 
measure observed. All needs attended and met. Will continue to monitor.

## 2019-03-21 NOTE — NUR
NURSE NOTES:

Pt's daughter asked that the breathing tx be continued even through comfort care measures. 
RN endorsed to Dr. Prince who agreed and asked that the order for Ipratropium HHN be 
continued through comfort care.

## 2019-03-21 NOTE — PROGRESS NOTE
DATE:  03/20/2019

CARDIOLOGY PROGRESS NOTE



SUBJECTIVE:  The patient's condition is largely unchanged.  She remains

withdrawn, lethargic, and poorly responsive.  She is on IV fluids.  Family

are at bedside.  The patient continues to receive anxiolytics and

narcotics with comfort.



OBJECTIVE:

VITAL SIGNS:  Blood pressure is 161/107, heart rate 105, respiratory rate

18, and afebrile.

LUNGS:  Diminished breath sounds bilaterally.

CARDIAC:  Regular rhythm.  Rapid rate.  Normal S1, S2.

ABDOMEN:  Soft.

EXTREMITIES:  Trace edema.



IMPRESSION:

1. Advanced dementia.

2. Dehydration.

3. Hypernatremia.

4. Dysphagia.

5. Sinus tachycardia.

6. Chronic obstructive pulmonary disease.

7. Acute myocardial ischemia and possible non-ST elevation infarction.

8. Labile hypertension, likely related to anxiety and pain.



PLAN:

1. Options are limited.

2. We will recheck laboratory studies.

3. We will adjust IV fluids.

4. We will continue analgesics and pain control.

5. One should consider hospice and continuous morphine drip, otherwise a

feeding tube may be required for adequate administration of drugs long

term.









  ______________________________________________

  Calvin Whitfield M.D.





DR:  DEMETRIA

D:  03/21/2019 01:41

T:  03/21/2019 04:08

JOB#:  [id number]

CC:

## 2019-03-21 NOTE — PULMONOLOGY PROGRESS NOTE
Assessment/Plan


Assessment/Plan





IMPRESSION:


1. sinus tachycardia.


2. Elevated troponin.


3. Possible demand ischemia.


4. Possible acute myocardial infarction.


5. Evidence of acute renal failure.


6. Hypernatremia.


7. Chronic obstructive pulmonary disease.


8. Shortness of breath.


9. hypertension


10. anxiety 





PLAN


IV hydration


morphine drip 


ativan prn 


keep comfortable; per daughter dying patient protocol


DNR confirmed


prognosis poor;  


impression, plan, and exam edited and reviewed in detail


care discussed with RN





Subjective


ROS Limited/Unobtainable:  Yes


Allergies:  


Coded Allergies:  


     ASPIRIN (Verified  Allergy, Unknown, 3/15/19)


Subjective


withdrawn


poorly responsive


per daughter, wants comfort care and dying patient protocol





Objective





Last 24 Hour Vital Signs








  Date Time  Temp Pulse Resp B/P (MAP) Pulse Ox O2 Delivery O2 Flow Rate FiO2


 


3/21/19 08:13      Non-Rebreather 15.0 100


 


3/21/19 08:12     95 Non-Rebreather 15.0 100


 


3/21/19 08:11  55 28  95 Non-Rebreather 15.0 100


 


3/21/19 08:01  52 28  95 Non-Rebreather 15.0 100


 


3/21/19 06:39  68  177/93    


 


3/21/19 04:19  68  177/93    


 


3/21/19 04:00  135      


 


3/21/19 04:00 97.0 75 24 171/86 (114) 92   


 


3/21/19 02:48    162/96    


 


3/21/19 01:47  92 20  96 Non-Rebreather 15.0 100


 


3/21/19 01:39  109 26   Non-Rebreather 15.0 100


 


3/21/19 01:37  92 24  95 Non-Rebreather 15.0 100


 


3/21/19 00:00  117      


 


3/21/19 00:00 97.0 113 34 153/103 (120) 94   


 


3/20/19 23:50  81  172/75    


 


3/20/19 22:47  86  161/107    


 


3/20/19 22:30      Non-Rebreather 15.0 


 


3/20/19 20:19  86 20  96 Non-Rebreather 15.0 100


 


3/20/19 20:13      Non-Rebreather 15.0 100


 


3/20/19 20:12     95 Non-Rebreather 15.0 100


 


3/20/19 20:09  85 28  95 Non-Rebreather 15.0 100


 


3/20/19 18:34  105  161/107    


 


3/20/19 18:17 97.9       


 


3/20/19 16:00  105      


 


3/20/19 16:00 97.9 114 35 161/107 (125) 99   


 


3/20/19 12:59  108 24  97 Non-Rebreather 15.0 100


 


3/20/19 12:54  116  163/107    


 


3/20/19 12:49  110 28  97 Non-Rebreather 15.0 100


 


3/20/19 12:00  116      


 


3/20/19 12:00 99.2 116 40 163/107 (125) 97   


 


3/20/19 09:00      Non-Rebreather 15.0 

















Intake and Output  


 


 3/20/19 3/21/19





 19:00 07:00


 


  


 


# Voids 3 1








Objective


GENERAL:  A well-developed, chronically ill female. poorly responsive


HEENT:  Overall fairly negative.


NECK:  Otherwise supple.


LUNGS:  Moderate breath sounds. some rhonchi and wheezes. tachypneic


CARDIAC:  Tachycardic without murmurs or rubs.


ABDOMEN:  Soft and nontender. no distentions


EXTREMITIES:  No cyanosis.  No clear clubbing.


NEUROLOGIC:  noncommunicative nonverbal currently.


Laboratory Tests


3/21/19 06:35: 


White Blood Count 24.2*H, Red Blood Count 5.01, Hemoglobin 15.6, Hematocrit 44.1

, Mean Corpuscular Volume 88, Mean Corpuscular Hemoglobin 31.2H, Mean 

Corpuscular Hemoglobin Concent 35.5, Red Cell Distribution Width 12.8, Platelet 

Count 221, Mean Platelet Volume 7.1, Neutrophils (%) (Auto) , Lymphocytes (%) (

Auto) , Monocytes (%) (Auto) , Eosinophils (%) (Auto) , Basophils (%) (Auto) , 

Neutrophils % (Manual) [Pending], Lymphocytes % (Manual) [Pending], Platelet 

Estimate [Pending], Platelet Morphology [Pending], Sodium Level 119*L, 

Potassium Level 4.5, Chloride Level 83L, Carbon Dioxide Level 24, Anion Gap 11, 

Blood Urea Nitrogen 64H, Creatinine 1.7H, Estimat Glomerular Filtration Rate , 

Glucose Level 182H, Calcium Level 9.2, Magnesium Level 2.1, Total Bilirubin 1.4H

, Direct Bilirubin 0.4H, Aspartate Amino Transf (AST/SGOT) 53H, Alanine 

Aminotransferase (ALT/SGPT) 47, Alkaline Phosphatase 86, Troponin I 0.190H, 

Total Protein 6.7, Albumin 2.3L, Globulin 4.4, Albumin/Globulin Ratio 0.5L





Current Medications








 Medications


  (Trade)  Dose


 Ordered  Sig/Alisha


 Route


 PRN Reason  Start Time


 Stop Time Status Last Admin


Dose Admin


 


 Dextrose  1,000 ml @ 


 100 mls/hr  Q10H


 IV


   3/17/19 13:30


 4/15/19 13:29  3/21/19 06:38


 


 


 Ipratropium


 Bromide


  (Atrovent)  500 mcg  Q6HRT


 HHN


   3/20/19 01:00


 3/25/19 00:59  3/21/19 08:01


 


 


 Lorazepam


  (Ativan 2mg/ml


 1ml)  1 mg  Q4H  PRN


 IV


 For Anxiety  3/15/19 21:00


 3/22/19 20:59  3/20/19 09:35


 

















Audie Prince MD Mar 21, 2019 08:17

## 2019-03-21 NOTE — NUR
NURSE NOTES:

Pt received from Quynh HENLEY alert and oriented x0, nonverbal with no s/s of acute distress. 
Pt is on nonrebreather mask 15L, saturating at 94%. IV site asymptomatic and patent. Bed in 
lowest position. Call light and belongings within reach. Received call from lab that pt had 
Sodium level of 119, Troponin 0.190, and WBC level of 24.2. RN reported lab values to Dr. Prince who stated that the "pt's meds be d/cassidy except for Ativan PRN. Start morphine drip 
at 5 mg/hr. Daughter wants comfort care." Will implement and carry out orders.

## 2019-03-22 VITALS — SYSTOLIC BLOOD PRESSURE: 65 MMHG | DIASTOLIC BLOOD PRESSURE: 29 MMHG

## 2019-03-22 VITALS — SYSTOLIC BLOOD PRESSURE: 90 MMHG | DIASTOLIC BLOOD PRESSURE: 51 MMHG

## 2019-03-22 RX ADMIN — Medication SCH MCG: at 01:00

## 2019-03-22 NOTE — NUR
DEATH PRONOUNCEMENT:

No Code.  Called to pronounce patient.  Absence of spontaneous respirations, no cardiac or 
breath sounds on auscultation.  Pupils fixed and dilated.  No carotid pulse or chest 
movement.  Patient  at 0440 3/22/2019.  eliana Ellsworth notified PER jon Enciso (chg Rn) .



Family was notified at .

## 2019-03-22 NOTE — NUR
NURSE NOTES:

Called and left a message to Informed Dr. Prince and Daughter Shelby about passing of 
patient at 4:40am. Postmortem rendered. 

-------------------------------------------------------------------------------

Addendum: 03/22/19 at 0749 by SARA LANCE RN

-------------------------------------------------------------------------------

Postmortem care rendered. Called [facility] per protocol

## 2019-03-22 NOTE — NUR
NURSE NOTES:

10:26 pm Morphine 29.9ml started

4:17 am Morphine 3ml wasted

4:17am Morphine 30ml started

5:30am Morphine wasted 29ml.

## 2019-03-23 NOTE — PROGRESS NOTE
DATE:  03/21/2019

CARDIOLOGY PROGRESS NOTE



SUBJECTIVE:  The patient is withdrawn, poorly responsive, preterminal.

Blood pressure remains elevated.  Heart rate is labile.  Monitored rhythm

sinus and sinus tachycardia with episodes of atrial fibrillation.



OBJECTIVE:

GENERAL:  Nonverbal.

LUNGS:  Diminished breath sounds.

CARDIAC:  Irregularly irregular rhythm.  Normal S1, S2.

ABDOMEN:  Soft.

EXTREMITIES:  No edema.



IMPRESSION:

1. Advanced dementia.

2. Paroxysmal atrial fibrillation.

3. COPD.

4. Acute myocardial ischemia and possible non-ST-elevation infarction.

5. Preterminal state.



PLAN:

1. Comfort care.

2. Dying patient protocol to be initiated.  I will sign off.









  ______________________________________________

  Calvin Whitfield M.D.





DR:  DEMETRIA

D:  03/23/2019 01:31

T:  03/23/2019 05:45

JOB#:  [id number]

CC:

## 2019-03-24 NOTE — DISCHARGE SUMMARY
Discharge Summary


Discharge Summary


_


DEATH SUMMARY 





DATE OF ADMISSION: 3/15/2019


DATE OF EXPIRATION: 3/22/2019


 





REASON FOR ADMISSION: 


84 years old female with past medical history of COPD, hypothyroidism, possible 

coronary artery disease, dementia with psychosis, possible thrombotic disorder, 

chronic pain, possible depression, urinary incontinence, constipation, 

presented to emergency department  with significant respiratory distress.  





Patient apparently was on hospice care.  


The  family wanted to revoke hospice.  


The daughter still confirmed  DNR/DNI status.  


Patient by herself was unable to provide much of the information.  


Upon evaluation in emergency department patient required supplemental oxygen of 

4 L with pulse oximetry reaching  97%.  ABG was stable on current FiO2.


Patient was tachycardic with  heart rate of 120, hypothermic with temperature 

95.2 and tachypneic with respiratory rate initially from 28 , then up to 40.


Laboratory workup revealed no leukocytosis, stable hemoglobin and hematocrit.


Chemistry showed  evidence of dehydration with  sodium 150, BUN 57, creatinine 

1.2.  


Glucose 130.  


Troponin elevated 0.47.  


Lactic acid 1.4.  


Albumin 3.5.  


Urinalysis revealed +3 protein, +2 ketones, +2 blood, but no evidence of 

urinary tract infection.


Chest x-ray demonstrated minimal interstitial prominence and central bronchial 

wall thickening, possibly related to senescent changes or COPD changes.


No definite acute process.


Patient  was admitted   for further management.


 


CONSULTANTS:


cardiologist 


 


 


HOSPITAL COURSE: 


Patient admitted to telemetry floor and started on hypotonic IV solution.  


DNR/DNI status was confirmed.


Patient started on intravenous antibiotics and intravenous steroids.


Inhaled bronchodilator provided via HHN therapy.  


Supplemental oxygen provided as needed to keep pulse oximetry above 92%.


Renal parameters and electrolytes were closely monitored, electrolytes 

corrected as needed.  


Cardiologist closely followed due to elevated troponin.


Blood pressure demonstrated hypertensive urgency . 


Antihypertensive regimen initially consisted of topical and intravenous 

antihypertensive , and further optimized as per cardiology recommendation.





Serial troponin showed trend down, but still elevated;  last troponin -0.19.  


Patient started on full anticoagulation 


Patient started on cautious use of beta-blocker .


Pain management was addressed. 





Bedside swallow evaluation revealed high risk for aspiration.


Speech therapist recommended non-oral feeding if needed.  





Patient developed leukocytosis on 3/18 WBC- 19.2  with kow-grade fever 100.3.


Blood cultures were negative.


Patient was on empiric antibiotics.


Leukocytosis was possibly due to steroids.


Anxiolytic provided as needed.


Patient developed paroxysmal atrial fibrillation.  


Heart rate was controlled with beta-blocker and Cardizem.  Apixaban provided   

for anticoagulation.


Supplemental oxygen further titrated to keep pulse oximetry above 90%.  


Patient was on Venturi mask and then on 100% nonrebreathing mask.





Patient condition remained critical and overall prognosis was extremely poor.


Family decided on comfort care


Dying protocol initiated with morphine drip.


Patient was pronounced on 3/22  at 0440.


Cause of death: cardiopulmonary arrest





FINAL DIAGNOSES: 


Hypertensive urgency


Acute myocardial ischemia , possible non-STEMI


Acute hypoxemic respiratory failure


Acute kidney injury due to prerenal azotemia 


Severe dehydration with hypernatremia and hyperchloremia  


COPD with paroxysmal bronchospasm 


Possible aspiration pneumonia  


Advanced dementia


Aspirin allergy


History of hypercoagulable state


Sinus tachycardia  


Mild protein calorie malnutrition


Leucocytosis 


Dysphagia








 





I have been assigned to dictate discharge summary for this account. 


I was not involved in the patient's management.











Tasha Doyle NP Mar 24, 2019 15:31